# Patient Record
Sex: FEMALE | Race: WHITE | NOT HISPANIC OR LATINO | URBAN - METROPOLITAN AREA
[De-identification: names, ages, dates, MRNs, and addresses within clinical notes are randomized per-mention and may not be internally consistent; named-entity substitution may affect disease eponyms.]

---

## 2017-08-30 ENCOUNTER — OUTPATIENT (OUTPATIENT)
Dept: OUTPATIENT SERVICES | Facility: HOSPITAL | Age: 32
LOS: 1 days | Discharge: HOME | End: 2017-08-30

## 2017-08-30 ENCOUNTER — APPOINTMENT (OUTPATIENT)
Dept: ANTEPARTUM | Facility: CLINIC | Age: 32
End: 2017-08-30

## 2017-08-30 PROBLEM — Z00.00 ENCOUNTER FOR PREVENTIVE HEALTH EXAMINATION: Status: ACTIVE | Noted: 2017-08-30

## 2017-11-10 ENCOUNTER — RECORD ABSTRACTING (OUTPATIENT)
Age: 32
End: 2017-11-10

## 2017-11-10 DIAGNOSIS — Z78.9 OTHER SPECIFIED HEALTH STATUS: ICD-10-CM

## 2017-11-10 DIAGNOSIS — Z98.890 OTHER SPECIFIED POSTPROCEDURAL STATES: ICD-10-CM

## 2017-11-14 ENCOUNTER — OUTPATIENT (OUTPATIENT)
Dept: OUTPATIENT SERVICES | Facility: HOSPITAL | Age: 32
LOS: 1 days | Discharge: HOME | End: 2017-11-14

## 2017-11-16 ENCOUNTER — APPOINTMENT (OUTPATIENT)
Dept: OBGYN | Facility: CLINIC | Age: 32
End: 2017-11-16
Payer: SELF-PAY

## 2017-11-16 ENCOUNTER — TRANSCRIPTION ENCOUNTER (OUTPATIENT)
Age: 32
End: 2017-11-16

## 2017-11-16 ENCOUNTER — OUTPATIENT (OUTPATIENT)
Dept: OUTPATIENT SERVICES | Facility: HOSPITAL | Age: 32
LOS: 1 days | Discharge: HOME | End: 2017-11-16

## 2017-11-16 VITALS
BODY MASS INDEX: 34.83 KG/M2 | SYSTOLIC BLOOD PRESSURE: 165 MMHG | DIASTOLIC BLOOD PRESSURE: 98 MMHG | HEIGHT: 64 IN | WEIGHT: 204 LBS

## 2017-11-16 PROCEDURE — 81002 URINALYSIS NONAUTO W/O SCOPE: CPT

## 2017-11-16 PROCEDURE — 0502F SUBSEQUENT PRENATAL CARE: CPT

## 2017-11-20 LAB
GLUCOSE UR-MCNC: NORMAL
LEUKOCYTE ESTERASE UR QL STRIP: NORMAL
NITRITE UR QL STRIP: NORMAL
PROT UR STRIP-MCNC: NORMAL

## 2017-11-30 ENCOUNTER — APPOINTMENT (OUTPATIENT)
Dept: OBGYN | Facility: CLINIC | Age: 32
End: 2017-11-30

## 2017-12-20 ENCOUNTER — OUTPATIENT (OUTPATIENT)
Dept: OUTPATIENT SERVICES | Facility: HOSPITAL | Age: 32
LOS: 1 days | Discharge: HOME | End: 2017-12-20

## 2017-12-20 DIAGNOSIS — O99.89 OTHER SPECIFIED DISEASES AND CONDITIONS COMPLICATING PREGNANCY, CHILDBIRTH AND THE PUERPERIUM: ICD-10-CM

## 2017-12-28 ENCOUNTER — OUTPATIENT (OUTPATIENT)
Dept: OUTPATIENT SERVICES | Facility: HOSPITAL | Age: 32
LOS: 1 days | Discharge: HOME | End: 2017-12-28

## 2017-12-28 DIAGNOSIS — N89.8 OTHER SPECIFIED NONINFLAMMATORY DISORDERS OF VAGINA: ICD-10-CM

## 2017-12-28 DIAGNOSIS — O99.89 OTHER SPECIFIED DISEASES AND CONDITIONS COMPLICATING PREGNANCY, CHILDBIRTH AND THE PUERPERIUM: ICD-10-CM

## 2018-01-04 ENCOUNTER — INPATIENT (INPATIENT)
Facility: HOSPITAL | Age: 33
LOS: 3 days | Discharge: HOME | End: 2018-01-08
Attending: OBSTETRICS & GYNECOLOGY | Admitting: OBSTETRICS & GYNECOLOGY

## 2018-01-04 DIAGNOSIS — O99.89 OTHER SPECIFIED DISEASES AND CONDITIONS COMPLICATING PREGNANCY, CHILDBIRTH AND THE PUERPERIUM: ICD-10-CM

## 2018-01-12 DIAGNOSIS — Z3A.36 36 WEEKS GESTATION OF PREGNANCY: ICD-10-CM

## 2018-07-10 ENCOUNTER — OUTPATIENT (OUTPATIENT)
Dept: OUTPATIENT SERVICES | Facility: HOSPITAL | Age: 33
LOS: 1 days | Discharge: HOME | End: 2018-07-10

## 2018-07-10 DIAGNOSIS — E05.00 THYROTOXICOSIS WITH DIFFUSE GOITER WITHOUT THYROTOXIC CRISIS OR STORM: ICD-10-CM

## 2019-02-25 ENCOUNTER — OUTPATIENT (OUTPATIENT)
Dept: OUTPATIENT SERVICES | Facility: HOSPITAL | Age: 34
LOS: 1 days | Discharge: HOME | End: 2019-02-25

## 2019-02-25 DIAGNOSIS — R10.2 PELVIC AND PERINEAL PAIN: ICD-10-CM

## 2019-04-23 ENCOUNTER — OUTPATIENT (OUTPATIENT)
Dept: OUTPATIENT SERVICES | Facility: HOSPITAL | Age: 34
LOS: 1 days | Discharge: HOME | End: 2019-04-23

## 2019-04-23 DIAGNOSIS — N89.8 OTHER SPECIFIED NONINFLAMMATORY DISORDERS OF VAGINA: ICD-10-CM

## 2019-05-17 ENCOUNTER — INPATIENT (INPATIENT)
Facility: HOSPITAL | Age: 34
LOS: 1 days | Discharge: HOME | End: 2019-05-19
Attending: OBSTETRICS & GYNECOLOGY | Admitting: OBSTETRICS & GYNECOLOGY

## 2019-05-17 VITALS — SYSTOLIC BLOOD PRESSURE: 135 MMHG | HEART RATE: 103 BPM | DIASTOLIC BLOOD PRESSURE: 73 MMHG

## 2019-05-17 DIAGNOSIS — Z90.49 ACQUIRED ABSENCE OF OTHER SPECIFIED PARTS OF DIGESTIVE TRACT: Chronic | ICD-10-CM

## 2019-05-17 DIAGNOSIS — Z98.890 OTHER SPECIFIED POSTPROCEDURAL STATES: Chronic | ICD-10-CM

## 2019-05-17 DIAGNOSIS — Z90.89 ACQUIRED ABSENCE OF OTHER ORGANS: Chronic | ICD-10-CM

## 2019-05-17 LAB
ALBUMIN SERPL ELPH-MCNC: 3.6 G/DL — SIGNIFICANT CHANGE UP (ref 3.5–5.2)
ALP SERPL-CCNC: 118 U/L — HIGH (ref 30–115)
ALT FLD-CCNC: 11 U/L — SIGNIFICANT CHANGE UP (ref 0–41)
AMPHET UR-MCNC: NEGATIVE — SIGNIFICANT CHANGE UP
ANION GAP SERPL CALC-SCNC: 11 MMOL/L — SIGNIFICANT CHANGE UP (ref 7–14)
APPEARANCE UR: CLEAR — SIGNIFICANT CHANGE UP
AST SERPL-CCNC: 15 U/L — SIGNIFICANT CHANGE UP (ref 0–41)
BARBITURATES UR SCN-MCNC: NEGATIVE — SIGNIFICANT CHANGE UP
BASOPHILS # BLD AUTO: 0.02 K/UL — SIGNIFICANT CHANGE UP (ref 0–0.2)
BASOPHILS NFR BLD AUTO: 0.2 % — SIGNIFICANT CHANGE UP (ref 0–1)
BENZODIAZ UR-MCNC: NEGATIVE — SIGNIFICANT CHANGE UP
BILIRUB SERPL-MCNC: <0.2 MG/DL — SIGNIFICANT CHANGE UP (ref 0.2–1.2)
BILIRUB UR-MCNC: NEGATIVE — SIGNIFICANT CHANGE UP
BLD GP AB SCN SERPL QL: SIGNIFICANT CHANGE UP
BUN SERPL-MCNC: 11 MG/DL — SIGNIFICANT CHANGE UP (ref 10–20)
BUPRENORPHINE SCREEN, URINE RESULT: NEGATIVE — SIGNIFICANT CHANGE UP
CALCIUM SERPL-MCNC: 8.9 MG/DL — SIGNIFICANT CHANGE UP (ref 8.5–10.1)
CHLORIDE SERPL-SCNC: 104 MMOL/L — SIGNIFICANT CHANGE UP (ref 98–110)
CO2 SERPL-SCNC: 19 MMOL/L — SIGNIFICANT CHANGE UP (ref 17–32)
COCAINE METAB.OTHER UR-MCNC: NEGATIVE — SIGNIFICANT CHANGE UP
COLOR SPEC: YELLOW — SIGNIFICANT CHANGE UP
CREAT ?TM UR-MCNC: 63 MG/DL — SIGNIFICANT CHANGE UP
CREAT SERPL-MCNC: 0.5 MG/DL — LOW (ref 0.7–1.5)
DIFF PNL FLD: NEGATIVE — SIGNIFICANT CHANGE UP
EOSINOPHIL # BLD AUTO: 0.02 K/UL — SIGNIFICANT CHANGE UP (ref 0–0.7)
EOSINOPHIL NFR BLD AUTO: 0.2 % — SIGNIFICANT CHANGE UP (ref 0–8)
GLUCOSE SERPL-MCNC: 75 MG/DL — SIGNIFICANT CHANGE UP (ref 70–99)
GLUCOSE UR QL: NEGATIVE MG/DL — SIGNIFICANT CHANGE UP
HCT VFR BLD CALC: 36.1 % — LOW (ref 37–47)
HGB BLD-MCNC: 12.1 G/DL — SIGNIFICANT CHANGE UP (ref 12–16)
IMM GRANULOCYTES NFR BLD AUTO: 0.2 % — SIGNIFICANT CHANGE UP (ref 0.1–0.3)
KETONES UR-MCNC: NEGATIVE — SIGNIFICANT CHANGE UP
L&D DRUG SCREEN, URINE: SIGNIFICANT CHANGE UP
LDH SERPL L TO P-CCNC: 164 — SIGNIFICANT CHANGE UP (ref 50–242)
LEUKOCYTE ESTERASE UR-ACNC: NEGATIVE — SIGNIFICANT CHANGE UP
LYMPHOCYTES # BLD AUTO: 1.36 K/UL — SIGNIFICANT CHANGE UP (ref 1.2–3.4)
LYMPHOCYTES # BLD AUTO: 16.8 % — LOW (ref 20.5–51.1)
MCHC RBC-ENTMCNC: 30.3 PG — SIGNIFICANT CHANGE UP (ref 27–31)
MCHC RBC-ENTMCNC: 33.5 G/DL — SIGNIFICANT CHANGE UP (ref 32–37)
MCV RBC AUTO: 90.5 FL — SIGNIFICANT CHANGE UP (ref 81–99)
METHADONE UR-MCNC: NEGATIVE — SIGNIFICANT CHANGE UP
MONOCYTES # BLD AUTO: 0.65 K/UL — HIGH (ref 0.1–0.6)
MONOCYTES NFR BLD AUTO: 8 % — SIGNIFICANT CHANGE UP (ref 1.7–9.3)
NEUTROPHILS # BLD AUTO: 6.01 K/UL — SIGNIFICANT CHANGE UP (ref 1.4–6.5)
NEUTROPHILS NFR BLD AUTO: 74.6 % — SIGNIFICANT CHANGE UP (ref 42.2–75.2)
NITRITE UR-MCNC: NEGATIVE — SIGNIFICANT CHANGE UP
NRBC # BLD: 0 /100 WBCS — SIGNIFICANT CHANGE UP (ref 0–0)
OPIATES UR-MCNC: NEGATIVE — SIGNIFICANT CHANGE UP
OXYCODONE UR-MCNC: NEGATIVE — SIGNIFICANT CHANGE UP
PCP UR-MCNC: NEGATIVE — SIGNIFICANT CHANGE UP
PH UR: 7 — SIGNIFICANT CHANGE UP (ref 5–8)
PLATELET # BLD AUTO: 245 K/UL — SIGNIFICANT CHANGE UP (ref 130–400)
POTASSIUM SERPL-MCNC: 4.8 MMOL/L — SIGNIFICANT CHANGE UP (ref 3.5–5)
POTASSIUM SERPL-SCNC: 4.8 MMOL/L — SIGNIFICANT CHANGE UP (ref 3.5–5)
PRENATAL SYPHILIS TEST: SIGNIFICANT CHANGE UP
PROPOXYPHENE QUALITATIVE URINE RESULT: NEGATIVE — SIGNIFICANT CHANGE UP
PROT ?TM UR-MCNC: 20 MG/DLG/24H — SIGNIFICANT CHANGE UP
PROT SERPL-MCNC: 6.4 G/DL — SIGNIFICANT CHANGE UP (ref 6–8)
PROT UR-MCNC: NEGATIVE MG/DL — SIGNIFICANT CHANGE UP
PROT/CREAT UR-RTO: 0.3 RATIO — HIGH (ref 0–0.2)
RBC # BLD: 3.99 M/UL — LOW (ref 4.2–5.4)
RBC # FLD: 13.9 % — SIGNIFICANT CHANGE UP (ref 11.5–14.5)
SODIUM SERPL-SCNC: 134 MMOL/L — LOW (ref 135–146)
SP GR SPEC: 1.01 — SIGNIFICANT CHANGE UP (ref 1.01–1.03)
TYPE + AB SCN PNL BLD: SIGNIFICANT CHANGE UP
URATE SERPL-MCNC: 4.1 MG/DL — SIGNIFICANT CHANGE UP (ref 2.5–7)
UROBILINOGEN FLD QL: 1 MG/DL (ref 0.2–0.2)
WBC # BLD: 8.08 K/UL — SIGNIFICANT CHANGE UP (ref 4.8–10.8)
WBC # FLD AUTO: 8.08 K/UL — SIGNIFICANT CHANGE UP (ref 4.8–10.8)

## 2019-05-17 RX ORDER — GLYCERIN ADULT
1 SUPPOSITORY, RECTAL RECTAL AT BEDTIME
Refills: 0 | Status: DISCONTINUED | OUTPATIENT
Start: 2019-05-17 | End: 2019-05-19

## 2019-05-17 RX ORDER — KETOROLAC TROMETHAMINE 30 MG/ML
30 SYRINGE (ML) INJECTION ONCE
Refills: 0 | Status: DISCONTINUED | OUTPATIENT
Start: 2019-05-17 | End: 2019-05-17

## 2019-05-17 RX ORDER — SIMETHICONE 80 MG/1
80 TABLET, CHEWABLE ORAL EVERY 4 HOURS
Refills: 0 | Status: DISCONTINUED | OUTPATIENT
Start: 2019-05-17 | End: 2019-05-19

## 2019-05-17 RX ORDER — METHYLDOPA 250 MG
500 TABLET ORAL EVERY 12 HOURS
Refills: 0 | Status: DISCONTINUED | OUTPATIENT
Start: 2019-05-17 | End: 2019-05-19

## 2019-05-17 RX ORDER — MAGNESIUM HYDROXIDE 400 MG/1
30 TABLET, CHEWABLE ORAL
Refills: 0 | Status: DISCONTINUED | OUTPATIENT
Start: 2019-05-17 | End: 2019-05-19

## 2019-05-17 RX ORDER — METHYLDOPA 250 MG
500 TABLET ORAL
Refills: 0 | Status: DISCONTINUED | OUTPATIENT
Start: 2019-05-17 | End: 2019-05-17

## 2019-05-17 RX ORDER — BENZOCAINE 10 %
1 GEL (GRAM) MUCOUS MEMBRANE EVERY 6 HOURS
Refills: 0 | Status: DISCONTINUED | OUTPATIENT
Start: 2019-05-17 | End: 2019-05-19

## 2019-05-17 RX ORDER — DOCUSATE SODIUM 100 MG
100 CAPSULE ORAL
Refills: 0 | Status: DISCONTINUED | OUTPATIENT
Start: 2019-05-17 | End: 2019-05-19

## 2019-05-17 RX ORDER — AER TRAVELER 0.5 G/1
1 SOLUTION RECTAL; TOPICAL EVERY 4 HOURS
Refills: 0 | Status: DISCONTINUED | OUTPATIENT
Start: 2019-05-17 | End: 2019-05-19

## 2019-05-17 RX ORDER — SODIUM CHLORIDE 9 MG/ML
3 INJECTION INTRAMUSCULAR; INTRAVENOUS; SUBCUTANEOUS EVERY 8 HOURS
Refills: 0 | Status: DISCONTINUED | OUTPATIENT
Start: 2019-05-17 | End: 2019-05-19

## 2019-05-17 RX ORDER — DIPHENHYDRAMINE HCL 50 MG
25 CAPSULE ORAL EVERY 6 HOURS
Refills: 0 | Status: DISCONTINUED | OUTPATIENT
Start: 2019-05-17 | End: 2019-05-19

## 2019-05-17 RX ORDER — OXYTOCIN 10 UNIT/ML
2 VIAL (ML) INJECTION
Qty: 30 | Refills: 0 | Status: DISCONTINUED | OUTPATIENT
Start: 2019-05-17 | End: 2019-05-17

## 2019-05-17 RX ORDER — OXYTOCIN 10 UNIT/ML
333.33 VIAL (ML) INJECTION
Qty: 20 | Refills: 0 | Status: DISCONTINUED | OUTPATIENT
Start: 2019-05-17 | End: 2019-05-19

## 2019-05-17 RX ORDER — LANOLIN
1 OINTMENT (GRAM) TOPICAL EVERY 6 HOURS
Refills: 0 | Status: DISCONTINUED | OUTPATIENT
Start: 2019-05-17 | End: 2019-05-19

## 2019-05-17 RX ORDER — METHYLDOPA 250 MG
500 TABLET ORAL THREE TIMES A DAY
Refills: 0 | Status: DISCONTINUED | OUTPATIENT
Start: 2019-05-17 | End: 2019-05-17

## 2019-05-17 RX ORDER — SODIUM CHLORIDE 9 MG/ML
1000 INJECTION, SOLUTION INTRAVENOUS
Refills: 0 | Status: DISCONTINUED | OUTPATIENT
Start: 2019-05-17 | End: 2019-05-17

## 2019-05-17 RX ORDER — HYDROCORTISONE 1 %
1 OINTMENT (GRAM) TOPICAL EVERY 6 HOURS
Refills: 0 | Status: DISCONTINUED | OUTPATIENT
Start: 2019-05-17 | End: 2019-05-19

## 2019-05-17 RX ORDER — PRAMOXINE HYDROCHLORIDE 150 MG/15G
1 AEROSOL, FOAM RECTAL EVERY 4 HOURS
Refills: 0 | Status: DISCONTINUED | OUTPATIENT
Start: 2019-05-17 | End: 2019-05-19

## 2019-05-17 RX ORDER — OXYTOCIN 10 UNIT/ML
333.33 VIAL (ML) INJECTION
Qty: 20 | Refills: 0 | Status: DISCONTINUED | OUTPATIENT
Start: 2019-05-17 | End: 2019-05-17

## 2019-05-17 RX ORDER — DIBUCAINE 1 %
1 OINTMENT (GRAM) RECTAL EVERY 6 HOURS
Refills: 0 | Status: DISCONTINUED | OUTPATIENT
Start: 2019-05-17 | End: 2019-05-19

## 2019-05-17 RX ADMIN — Medication 500 MILLIGRAM(S): at 14:05

## 2019-05-17 RX ADMIN — Medication 500 MILLIGRAM(S): at 20:08

## 2019-05-17 RX ADMIN — Medication 30 MILLIGRAM(S): at 18:49

## 2019-05-17 RX ADMIN — Medication 2 MILLIUNIT(S)/MIN: at 09:11

## 2019-05-17 NOTE — PROGRESS NOTE ADULT - ASSESSMENT
32 yo  @39w, GBS neg, cHTN on aldomet, IOL for favorable cervix, on pitocin, doing well.    - pain management PRN  - cont EFM and toco  - clear liquid diet, IV hydration  - monitor vitals  - c/w pitocin    Dr. Berrios and Dr. Smith aware. 32 yo  @39w, GBS neg, cHTN on aldomet, IOL for favorable cervix, s/p epi, s/p AROM, on pitocin, doing well.    - pain management with epidural  - cont EFM and toco  - clear liquid diet, IV hydration  - monitor vitals  - c/w pitocin    Dr. Berrios and Dr. Smith aware.

## 2019-05-17 NOTE — OB PROVIDER H&P - PMH
Chronic hypertension  on aldomet 500 mg bid since 2018  Graves disease  remission since this current pregnancy; no medications for graves since 7weeks

## 2019-05-17 NOTE — OB PROVIDER H&P - ATTENDING COMMENTS
IMP: IUP @ 39 wks, Ch HTN on Aldomet, Favorable Cervix at Term    Plan: Admit, Pitocin Induction, Pain Management, Anticipated

## 2019-05-17 NOTE — OB PROVIDER H&P - ASSESSMENT
34 y/o  @ 39 weeks, chronic htn on aldomet favorable cervix for pitocin IOL  admit  hydrate  monitor  pitocin  analgesia prn  Dr Smith aware

## 2019-05-17 NOTE — OB PROVIDER H&P - NSHPLABSRESULTS_GEN_ALL_CORE
11/13/18 12.4 weeks single iup normal nuchal  12/12/18 @ 17.3 weeks s= d  1/4/19 @ 20.2 weeks normal anatomy  1/29/19 @ 24.3 weeks  transverse efw 763 grams placenta posterior  2/25/19 @ 27.3 weeks transverse posterior placenta efw 1096grams mvp44 bpp8/8  3/26/18 @ 31.1 weeks cephalic placenta posterior efw 1703 grams 11/13/18 12.4 weeks single iup normal nuchal  12/12/18 @ 17.3 weeks s= d  1/4/19 @ 20.2 weeks normal anatomy  1/29/19 @ 24.3 weeks  transverse efw 763 grams placenta posterior  2/25/19 @ 27.3 weeks transverse posterior placenta efw 1096grams mvp44 bpp8/8  3/26/18 @ 31.1 weeks cephalic placenta posterior efw 1703 grams    TFTs    TSH 1.35  T 4 free 1.1  T3 total 154    Glucose 81

## 2019-05-17 NOTE — PROCEDURE NOTE - NSANESTHEXAM_OBGYN_ALL_OB_FT
h/o chronic htn before pregnancy on aldomat 3x day and graves dx since 3/2018 on no medication. psh of appendectomy and tonsillectomy with no issues with anesthesia

## 2019-05-17 NOTE — OB PROVIDER H&P - HISTORY OF PRESENT ILLNESS
The pt is a 33y y/o G 3	P 1011 @ 39.1   weeks  edc   5/23/19  dated by  lmp & 1st trimester sonogram who presents to labor & delivery favorable cervix for IOL  Pt reports + fm no srom, no vaginal bleeding.

## 2019-05-17 NOTE — PROGRESS NOTE ADULT - ASSESSMENT
34 yo  @39w, GBS neg, cHTN on aldomet, IOL for favorable cervix, on pitocin, doing well.    - pain management PRN  - cont EFM and toco  - clear liquid diet, IV hydration  - monitor vitals  - c/w pitocin  - f/up Upr/cr    Dr. Carlisle and Dr. Smith aware.

## 2019-05-17 NOTE — OB PROVIDER DELIVERY SUMMARY - NSPROVIDERDELIVERYNOTE_OBGYN_ALL_OB_FT
Pt became fully dilated and pushed well over intact perineum, delivery of head in RAKESH position, nose and mouth bulb suctioned on perineum, followed by delivery of shoulders and body without difficulty, live male infant, APGARs 9/9, 3-vessel cord + placenta complete, no complications

## 2019-05-17 NOTE — OB PROVIDER H&P - NSHPPHYSICALEXAM_GEN_ALL_CORE
PHYSICAL EXAM:  T(F): 98.2 (05-17 @ 08:07)  HR: 100 (05-17 @ 08:07)  BP: 135/73 (05-17 @ 08:07)  RR: 18 (05-17 @ 08:07)  Constitutional: AAOx3, NAD  Abdomen: Soft, gravid, nontender, no palpable ctx    TOCO none  VE 2/50/-3 vertex

## 2019-05-17 NOTE — PROGRESS NOTE ADULT - SUBJECTIVE AND OBJECTIVE BOX
PGY I Note    S: Pt seen and examined at bedside. Doing well, pain controlled, experiencing some pressure with contractions.     Vital Signs Last 24 Hrs  T(F): 98.2 (17 May 2019 08:07), Max: 98.2 (17 May 2019 08:07)  HR: 101 (17 May 2019 17:00) (81 - 114)  BP: 119/79 (17 May 2019 17:00) (114/79 - 138/89)  RR: 18 (17 May 2019 08:07) (18 - 18)    EFM: 130/mod hemant/+accel  TOCO: q2-3min  SVE: /-1, vtx    Labs:                        12.1   8.08  )-----------( 245      ( 17 May 2019 08:10 )             36.1           134<L>  |  104  |  11  ----------------------------<  75  4.8   |  19  |  0.5<L>    Ca    8.9      17 May 2019 08:10    TPro  6.4  /  Alb  3.6  /  TBili  <0.2  /  DBili  x   /  AST  15  /  ALT  11  /  AlkPhos  118<H>        Urinalysis Basic - ( 17 May 2019 08:10 )    Color: Yellow / Appearance: Clear / S.015 / pH: x  Gluc: x / Ketone: Negative  / Bili: Negative / Urobili: 1.0 mg/dL   Blood: x / Protein: Negative mg/dL / Nitrite: Negative   Leuk Esterase: Negative / RBC: x / WBC x   Sq Epi: x / Non Sq Epi: x / Bacteria: x        Prenatal Syphilis Test: Nonreact (19 @ 08:10)      Meds:  69607 pitocin  @1340 epidural PGY I Note    S: Pt seen and examined at bedside. Doing well, pain controlled, experiencing some pressure with contractions.     Vital Signs Last 24 Hrs  T(F): 98.2 (17 May 2019 08:07), Max: 98.2 (17 May 2019 08:07)  HR: 101 (17 May 2019 17:00) (81 - 114)  BP: 119/79 (17 May 2019 17:00) (114/79 - 138/89)  RR: 18 (17 May 2019 08:07) (18 - 18)    EFM: 130/mod hemant/+accel  TOCO: q2-3min  SVE: /-1, vtx @4501 by Dr. Smith    Labs:                        12.1   8.08  )-----------( 245      ( 17 May 2019 08:10 )             36.1           134<L>  |  104  |  11  ----------------------------<  75  4.8   |  19  |  0.5<L>    Ca    8.9      17 May 2019 08:10    TPro  6.4  /  Alb  3.6  /  TBili  <0.2  /  DBili  x   /  AST  15  /  ALT  11  /  AlkPhos  118<H>        Urinalysis Basic - ( 17 May 2019 08:10 )    Color: Yellow / Appearance: Clear / S.015 / pH: x  Gluc: x / Ketone: Negative  / Bili: Negative / Urobili: 1.0 mg/dL   Blood: x / Protein: Negative mg/dL / Nitrite: Negative   Leuk Esterase: Negative / RBC: x / WBC x   Sq Epi: x / Non Sq Epi: x / Bacteria: x        Prenatal Syphilis Test: Nonreact (19 @ 08:10)      Meds:  98073 pitocin started, now @6mu/min  @1340 epidural PGY I Note    S: Pt seen at bedside for labor check. Doing well, pain controlled, experiencing some pressure with contractions.     Vital Signs Last 24 Hrs  T(F): 98.2 (17 May 2019 08:07), Max: 98.2 (17 May 2019 08:07)  HR: 101 (17 May 2019 17:00) (81 - 114)  BP: 119/79 (17 May 2019 17:00) (114/79 - 138/89)  RR: 18 (17 May 2019 08:07) (18 - 18)    EFM: 130/mod hemant/+accel  TOCO: q2-3min  SVE: /-1, vtx @7928 by Dr. Smith    Labs:                        12.1   8.08  )-----------( 245      ( 17 May 2019 08:10 )             36.1           134<L>  |  104  |  11  ----------------------------<  75  4.8   |  19  |  0.5<L>    Ca    8.9      17 May 2019 08:10    TPro  6.4  /  Alb  3.6  /  TBili  <0.2  /  DBili  x   /  AST  15  /  ALT  11  /  AlkPhos  118<H>        Urinalysis Basic - ( 17 May 2019 08:10 )    Color: Yellow / Appearance: Clear / S.015 / pH: x  Gluc: x / Ketone: Negative  / Bili: Negative / Urobili: 1.0 mg/dL   Blood: x / Protein: Negative mg/dL / Nitrite: Negative   Leuk Esterase: Negative / RBC: x / WBC x   Sq Epi: x / Non Sq Epi: x / Bacteria: x        Prenatal Syphilis Test: Nonreact (19 @ 08:10)      Meds:  89821 pitocin started, now @6mu/min  @1340 epidural

## 2019-05-18 ENCOUNTER — TRANSCRIPTION ENCOUNTER (OUTPATIENT)
Age: 34
End: 2019-05-18

## 2019-05-18 LAB
HCT VFR BLD CALC: 36.1 % — LOW (ref 37–47)
HGB BLD-MCNC: 12 G/DL — SIGNIFICANT CHANGE UP (ref 12–16)
MCHC RBC-ENTMCNC: 31.1 PG — HIGH (ref 27–31)
MCHC RBC-ENTMCNC: 33.2 G/DL — SIGNIFICANT CHANGE UP (ref 32–37)
MCV RBC AUTO: 93.5 FL — SIGNIFICANT CHANGE UP (ref 81–99)
NRBC # BLD: 0 /100 WBCS — SIGNIFICANT CHANGE UP (ref 0–0)
PLATELET # BLD AUTO: 224 K/UL — SIGNIFICANT CHANGE UP (ref 130–400)
RBC # BLD: 3.86 M/UL — LOW (ref 4.2–5.4)
RBC # FLD: 13.8 % — SIGNIFICANT CHANGE UP (ref 11.5–14.5)
WBC # BLD: 8.47 K/UL — SIGNIFICANT CHANGE UP (ref 4.8–10.8)
WBC # FLD AUTO: 8.47 K/UL — SIGNIFICANT CHANGE UP (ref 4.8–10.8)

## 2019-05-18 RX ORDER — IBUPROFEN 200 MG
600 TABLET ORAL EVERY 6 HOURS
Refills: 0 | Status: COMPLETED | OUTPATIENT
Start: 2019-05-18 | End: 2020-04-15

## 2019-05-18 RX ORDER — IBUPROFEN 200 MG
600 TABLET ORAL EVERY 6 HOURS
Refills: 0 | Status: DISCONTINUED | OUTPATIENT
Start: 2019-05-18 | End: 2019-05-19

## 2019-05-18 RX ORDER — OXYCODONE HYDROCHLORIDE 5 MG/1
5 TABLET ORAL
Refills: 0 | Status: DISCONTINUED | OUTPATIENT
Start: 2019-05-18 | End: 2019-05-19

## 2019-05-18 RX ORDER — KETOROLAC TROMETHAMINE 30 MG/ML
30 SYRINGE (ML) INJECTION ONCE
Refills: 0 | Status: DISCONTINUED | OUTPATIENT
Start: 2019-05-18 | End: 2019-05-19

## 2019-05-18 RX ORDER — ACETAMINOPHEN 500 MG
975 TABLET ORAL EVERY 6 HOURS
Refills: 0 | Status: DISCONTINUED | OUTPATIENT
Start: 2019-05-18 | End: 2019-05-19

## 2019-05-18 RX ORDER — OXYCODONE HYDROCHLORIDE 5 MG/1
5 TABLET ORAL ONCE
Refills: 0 | Status: DISCONTINUED | OUTPATIENT
Start: 2019-05-18 | End: 2019-05-19

## 2019-05-18 RX ADMIN — Medication 600 MILLIGRAM(S): at 17:31

## 2019-05-18 RX ADMIN — Medication 500 MILLIGRAM(S): at 06:17

## 2019-05-18 RX ADMIN — SODIUM CHLORIDE 3 MILLILITER(S): 9 INJECTION INTRAMUSCULAR; INTRAVENOUS; SUBCUTANEOUS at 21:12

## 2019-05-18 RX ADMIN — Medication 600 MILLIGRAM(S): at 18:00

## 2019-05-18 RX ADMIN — Medication 975 MILLIGRAM(S): at 15:03

## 2019-05-18 RX ADMIN — SODIUM CHLORIDE 3 MILLILITER(S): 9 INJECTION INTRAMUSCULAR; INTRAVENOUS; SUBCUTANEOUS at 06:16

## 2019-05-18 RX ADMIN — Medication 600 MILLIGRAM(S): at 11:38

## 2019-05-18 RX ADMIN — Medication 975 MILLIGRAM(S): at 04:47

## 2019-05-18 RX ADMIN — Medication 975 MILLIGRAM(S): at 21:11

## 2019-05-18 RX ADMIN — Medication 600 MILLIGRAM(S): at 12:10

## 2019-05-18 RX ADMIN — Medication 975 MILLIGRAM(S): at 15:33

## 2019-05-18 RX ADMIN — Medication 500 MILLIGRAM(S): at 17:31

## 2019-05-18 RX ADMIN — SODIUM CHLORIDE 3 MILLILITER(S): 9 INJECTION INTRAMUSCULAR; INTRAVENOUS; SUBCUTANEOUS at 14:37

## 2019-05-18 NOTE — PROGRESS NOTE ADULT - ASSESSMENT
IMP: s/p , CH HTN - PPD # 1 - Doing Well    Plan: f/u CBC, Continued Aldomet 500 BID, NSAID's/PNV's, Anticipated dc

## 2019-05-18 NOTE — PROGRESS NOTE ADULT - SUBJECTIVE AND OBJECTIVE BOX
Pt seen at bedside, no complaints, nursing    Vital Signs Last 24 Hrs  T(C): 36.6 (17 May 2019 23:38), Max: 36.9 (17 May 2019 17:45)  T(F): 97.9 (17 May 2019 23:38), Max: 98.5 (17 May 2019 17:45)  HR: 85 (18 May 2019 03:15) (81 - 136)  BP: 121/75 (18 May 2019 03:15) (114/79 - 138/89)  RR: 20 (17 May 2019 23:38) (18 - 20)    Resp - CTA b/l  CVS - S1S2+, RRR  Breasts - soft, NT  Abd - soft, NTND, BS+, uterus - firm  VE - Moderate lochia, perineum- intact  Ext - No Homans                          12.1   8.08  )-----------( 245      ( 17 May 2019 08:10 )             36.1       A POS, Rubella - Immune, Rubeola - Immune, RPR - NR

## 2019-05-19 ENCOUNTER — TRANSCRIPTION ENCOUNTER (OUTPATIENT)
Age: 34
End: 2019-05-19

## 2019-05-19 VITALS
DIASTOLIC BLOOD PRESSURE: 81 MMHG | RESPIRATION RATE: 18 BRPM | TEMPERATURE: 98 F | SYSTOLIC BLOOD PRESSURE: 128 MMHG | HEART RATE: 63 BPM

## 2019-05-19 RX ORDER — METHYLDOPA 250 MG
1 TABLET ORAL
Qty: 0 | Refills: 0 | DISCHARGE
Start: 2019-05-19

## 2019-05-19 RX ORDER — IBUPROFEN 200 MG
1 TABLET ORAL
Qty: 0 | Refills: 0 | DISCHARGE
Start: 2019-05-19

## 2019-05-19 RX ORDER — DOCUSATE SODIUM 100 MG
1 CAPSULE ORAL
Qty: 0 | Refills: 0 | DISCHARGE
Start: 2019-05-19

## 2019-05-19 RX ORDER — ACETAMINOPHEN 500 MG
3 TABLET ORAL
Qty: 0 | Refills: 0 | DISCHARGE
Start: 2019-05-19

## 2019-05-19 RX ADMIN — Medication 500 MILLIGRAM(S): at 06:36

## 2019-05-19 RX ADMIN — Medication 600 MILLIGRAM(S): at 06:34

## 2019-05-19 RX ADMIN — Medication 600 MILLIGRAM(S): at 11:15

## 2019-05-19 RX ADMIN — Medication 600 MILLIGRAM(S): at 00:06

## 2019-05-19 RX ADMIN — Medication 600 MILLIGRAM(S): at 07:10

## 2019-05-19 NOTE — PROGRESS NOTE ADULT - ASSESSMENT
IMP: s/p  -PPD # 2 - doing Well    Plan: dc home, NSAID's/PNV's, Continue aldomet 500 BID, f/u office - 6 wks

## 2019-05-19 NOTE — DISCHARGE NOTE OB - CARE PROVIDER_API CALL
Jeremy Smith)  Obstetrics and Gynecology  [address] [place] [address]  Phone: [phone]  Fax: [phone]  Follow Up Time:

## 2019-05-19 NOTE — DISCHARGE NOTE OB - PATIENT PORTAL LINK FT
You can access the [facility] Patient Portal, offered by [facility], by registering with the following website: [email or web address]

## 2019-05-19 NOTE — DISCHARGE NOTE OB - MEDICATION SUMMARY - MEDICATIONS TO TAKE
I will START or STAY ON the medications listed below when I get home from the hospital:    acetaminophen 325 mg oral tablet  -- 3 tab(s) by mouth every 6 hours  -- Indication: For pain    ibuprofen 600 mg oral tablet  -- 1 tab(s) by mouth every 6 hours  -- Indication: For pain    docusate sodium 100 mg oral capsule  -- 1 cap(s) by mouth 2 times a day, As needed, For stool softening  -- Indication: For Constipation I will START or STAY ON the medications listed below when I get home from the hospital:    acetaminophen 325 mg oral tablet  -- 3 tab(s) by mouth every 6 hours  -- Indication: For pain    ibuprofen 600 mg oral tablet  -- 1 tab(s) by mouth every 6 hours  -- Indication: For pain    methyldopa 500 mg oral tablet  -- 1 tab(s) by mouth every 12 hours  -- Indication: For blood pressure    docusate sodium 100 mg oral capsule  -- 1 cap(s) by mouth 2 times a day, As needed, For stool softening  -- Indication: For Constipation I will START or STAY ON the medications listed below when I get home from the hospital:    acetaminophen 325 mg oral tablet  -- 3 tab(s) by mouth every 6 hours, As Needed  -- Indication: For pain    ibuprofen 600 mg oral tablet  -- 1 tab(s) by mouth every 6 hours, As Needed  -- Indication: For pain    methyldopa 500 mg oral tablet  -- 1 tab(s) by mouth every 12 hours  -- Indication: For blood pressure    docusate sodium 100 mg oral capsule  -- 1 cap(s) by mouth 2 times a day, As needed, For stool softening  -- Indication: For Constipation

## 2019-05-19 NOTE — PROGRESS NOTE ADULT - SUBJECTIVE AND OBJECTIVE BOX
Pt seen at bedside, no complaints, nursing    Vital Signs Last 24 Hrs  T(C): 36.4 (19 May 2019 00:04), Max: 36.6 (18 May 2019 08:27)  T(F): 97.5 (19 May 2019 00:04), Max: 97.9 (18 May 2019 08:27)  HR: 73 (19 May 2019 03:21) (70 - 99)  BP: 139/87 (19 May 2019 03:21) (129/77 - 139/87)  RR: 20 (19 May 2019 00:04) (18 - 20)    Resp - CTA b/l  CVS - S1S2+, RRR  Breasts - soft, NT  Abd - soft, NTND, BS+, uterus - firm  VE - Minimal lochia, perineum- intact  Ext - No Homans                          12.0   8.47  )-----------( 224      ( 18 May 2019 11:55 )             36.1                         12.1   8.08  )-----------( 245      ( 17 May 2019 08:10 )             36.1

## 2019-05-19 NOTE — DISCHARGE NOTE OB - HOSPITAL COURSE
05-19-19 @ 10:15    HPI:  The pt is a 33y y/o G 3	P 1011 @ 39.1   weeks  edc   5/23/19  dated by  lmp & 1st trimester sonogram who presents to labor & delivery favorable cervix for IOL  Pt reports + fm no srom, no vaginal bleeding. (17 May 2019 08:05)      PAST MEDICAL & SURGICAL HISTORY:  Chronic hypertension: on aldomet 500 mg bid since 2018  Graves disease: remission since this current pregnancy; no medications for graves since 7weeks  History of tonsillectomy  H/O laparoscopy  History of appendectomy      POST PARTUM COURSE:   uncomplicated labor and postpartum course, discharged PPD2       LABS:                        12.0   8.47  )-----------( 224      ( 18 May 2019 11:55 )             36.1       05-17-19 @ 08:10      134<L>  |  104  |  11  ----------------------------<  75  4.8   |  19  |  0.5<L>        Ca    8.9      17 May 2019 08:10    TPro  6.4  /  Alb  3.6  /  TBili  <0.2  /  DBili  x   /  AST  15  /  ALT  11  /  AlkPhos  118<H>  05-17-19 @ 08:10        Allergies    No Known Allergies    Intolerances

## 2019-05-22 DIAGNOSIS — Z3A.39 39 WEEKS GESTATION OF PREGNANCY: ICD-10-CM

## 2020-11-22 ENCOUNTER — TRANSCRIPTION ENCOUNTER (OUTPATIENT)
Age: 35
End: 2020-11-22

## 2021-04-30 NOTE — PROCEDURAL SAFETY CHECKLIST WITH OR WITHOUT SEDATION - NSBLDPRODCTAVAIL_GEN_ALL_CORE
Routing refill request to provider for review/approval because:  Labs not current:  BMP 5/2019 - future order already in place  Patient needs to be seen because it has been more than 1 year since last office visit.  Last recorded BP > year    Arnaud Novak RN         not applicable

## 2021-06-21 ENCOUNTER — TRANSCRIPTION ENCOUNTER (OUTPATIENT)
Age: 36
End: 2021-06-21

## 2021-08-01 ENCOUNTER — TRANSCRIPTION ENCOUNTER (OUTPATIENT)
Age: 36
End: 2021-08-01

## 2021-08-04 ENCOUNTER — TRANSCRIPTION ENCOUNTER (OUTPATIENT)
Age: 36
End: 2021-08-04

## 2022-03-07 ENCOUNTER — EMERGENCY (EMERGENCY)
Facility: HOSPITAL | Age: 37
LOS: 0 days | Discharge: HOME | End: 2022-03-08
Attending: EMERGENCY MEDICINE | Admitting: EMERGENCY MEDICINE
Payer: COMMERCIAL

## 2022-03-07 VITALS
HEIGHT: 64 IN | HEART RATE: 118 BPM | SYSTOLIC BLOOD PRESSURE: 183 MMHG | TEMPERATURE: 99 F | RESPIRATION RATE: 18 BRPM | WEIGHT: 199.96 LBS | OXYGEN SATURATION: 99 % | DIASTOLIC BLOOD PRESSURE: 93 MMHG

## 2022-03-07 VITALS — DIASTOLIC BLOOD PRESSURE: 65 MMHG | SYSTOLIC BLOOD PRESSURE: 151 MMHG | HEART RATE: 97 BPM

## 2022-03-07 DIAGNOSIS — F32.A DEPRESSION, UNSPECIFIED: ICD-10-CM

## 2022-03-07 DIAGNOSIS — R82.71 BACTERIURIA: ICD-10-CM

## 2022-03-07 DIAGNOSIS — O20.9 HEMORRHAGE IN EARLY PREGNANCY, UNSPECIFIED: ICD-10-CM

## 2022-03-07 DIAGNOSIS — O99.891 OTHER SPECIFIED DISEASES AND CONDITIONS COMPLICATING PREGNANCY: ICD-10-CM

## 2022-03-07 DIAGNOSIS — Z90.89 ACQUIRED ABSENCE OF OTHER ORGANS: Chronic | ICD-10-CM

## 2022-03-07 DIAGNOSIS — Z98.890 OTHER SPECIFIED POSTPROCEDURAL STATES: Chronic | ICD-10-CM

## 2022-03-07 DIAGNOSIS — I10 ESSENTIAL (PRIMARY) HYPERTENSION: ICD-10-CM

## 2022-03-07 DIAGNOSIS — Z3A.08 8 WEEKS GESTATION OF PREGNANCY: ICD-10-CM

## 2022-03-07 DIAGNOSIS — O21.9 VOMITING OF PREGNANCY, UNSPECIFIED: ICD-10-CM

## 2022-03-07 DIAGNOSIS — O99.341 OTHER MENTAL DISORDERS COMPLICATING PREGNANCY, FIRST TRIMESTER: ICD-10-CM

## 2022-03-07 DIAGNOSIS — Z90.49 ACQUIRED ABSENCE OF OTHER SPECIFIED PARTS OF DIGESTIVE TRACT: Chronic | ICD-10-CM

## 2022-03-07 PROBLEM — E05.00 THYROTOXICOSIS WITH DIFFUSE GOITER WITHOUT THYROTOXIC CRISIS OR STORM: Chronic | Status: ACTIVE | Noted: 2019-05-17

## 2022-03-07 LAB
ALBUMIN SERPL ELPH-MCNC: 4.3 G/DL — SIGNIFICANT CHANGE UP (ref 3.5–5.2)
ALP SERPL-CCNC: 66 U/L — SIGNIFICANT CHANGE UP (ref 30–115)
ALT FLD-CCNC: 29 U/L — SIGNIFICANT CHANGE UP (ref 0–41)
ANION GAP SERPL CALC-SCNC: 13 MMOL/L — SIGNIFICANT CHANGE UP (ref 7–14)
APPEARANCE UR: ABNORMAL
AST SERPL-CCNC: 17 U/L — SIGNIFICANT CHANGE UP (ref 0–41)
B-OH-BUTYR SERPL-SCNC: 0.4 MMOL/L — SIGNIFICANT CHANGE UP
BACTERIA # UR AUTO: NEGATIVE — SIGNIFICANT CHANGE UP
BASOPHILS # BLD AUTO: 0.03 K/UL — SIGNIFICANT CHANGE UP (ref 0–0.2)
BASOPHILS NFR BLD AUTO: 0.3 % — SIGNIFICANT CHANGE UP (ref 0–1)
BILIRUB SERPL-MCNC: 0.4 MG/DL — SIGNIFICANT CHANGE UP (ref 0.2–1.2)
BILIRUB UR-MCNC: ABNORMAL
BLD GP AB SCN SERPL QL: SIGNIFICANT CHANGE UP
BUN SERPL-MCNC: 7 MG/DL — LOW (ref 10–20)
CALCIUM SERPL-MCNC: 9.6 MG/DL — SIGNIFICANT CHANGE UP (ref 8.5–10.1)
CHLORIDE SERPL-SCNC: 101 MMOL/L — SIGNIFICANT CHANGE UP (ref 98–110)
CO2 SERPL-SCNC: 22 MMOL/L — SIGNIFICANT CHANGE UP (ref 17–32)
COLOR SPEC: YELLOW — SIGNIFICANT CHANGE UP
CREAT SERPL-MCNC: 0.7 MG/DL — SIGNIFICANT CHANGE UP (ref 0.7–1.5)
DIFF PNL FLD: ABNORMAL
EGFR: 116 ML/MIN/1.73M2 — SIGNIFICANT CHANGE UP
EOSINOPHIL # BLD AUTO: 0.05 K/UL — SIGNIFICANT CHANGE UP (ref 0–0.7)
EOSINOPHIL NFR BLD AUTO: 0.6 % — SIGNIFICANT CHANGE UP (ref 0–8)
EPI CELLS # UR: 9 /HPF — HIGH (ref 0–5)
GLUCOSE SERPL-MCNC: 83 MG/DL — SIGNIFICANT CHANGE UP (ref 70–99)
GLUCOSE UR QL: NEGATIVE — SIGNIFICANT CHANGE UP
HCG SERPL-ACNC: HIGH MIU/ML
HCT VFR BLD CALC: 37.8 % — SIGNIFICANT CHANGE UP (ref 37–47)
HGB BLD-MCNC: 12.4 G/DL — SIGNIFICANT CHANGE UP (ref 12–16)
HYALINE CASTS # UR AUTO: 16 /LPF — HIGH (ref 0–7)
IMM GRANULOCYTES NFR BLD AUTO: 0.2 % — SIGNIFICANT CHANGE UP (ref 0.1–0.3)
KETONES UR-MCNC: ABNORMAL
LEUKOCYTE ESTERASE UR-ACNC: ABNORMAL
LIDOCAIN IGE QN: 21 U/L — SIGNIFICANT CHANGE UP (ref 7–60)
LYMPHOCYTES # BLD AUTO: 1.98 K/UL — SIGNIFICANT CHANGE UP (ref 1.2–3.4)
LYMPHOCYTES # BLD AUTO: 22.3 % — SIGNIFICANT CHANGE UP (ref 20.5–51.1)
MCHC RBC-ENTMCNC: 28.8 PG — SIGNIFICANT CHANGE UP (ref 27–31)
MCHC RBC-ENTMCNC: 32.8 G/DL — SIGNIFICANT CHANGE UP (ref 32–37)
MCV RBC AUTO: 87.9 FL — SIGNIFICANT CHANGE UP (ref 81–99)
MONOCYTES # BLD AUTO: 0.54 K/UL — SIGNIFICANT CHANGE UP (ref 0.1–0.6)
MONOCYTES NFR BLD AUTO: 6.1 % — SIGNIFICANT CHANGE UP (ref 1.7–9.3)
NEUTROPHILS # BLD AUTO: 6.26 K/UL — SIGNIFICANT CHANGE UP (ref 1.4–6.5)
NEUTROPHILS NFR BLD AUTO: 70.5 % — SIGNIFICANT CHANGE UP (ref 42.2–75.2)
NITRITE UR-MCNC: NEGATIVE — SIGNIFICANT CHANGE UP
NRBC # BLD: 0 /100 WBCS — SIGNIFICANT CHANGE UP (ref 0–0)
PH UR: 6 — SIGNIFICANT CHANGE UP (ref 5–8)
PLATELET # BLD AUTO: 370 K/UL — SIGNIFICANT CHANGE UP (ref 130–400)
POTASSIUM SERPL-MCNC: 3.8 MMOL/L — SIGNIFICANT CHANGE UP (ref 3.5–5)
POTASSIUM SERPL-SCNC: 3.8 MMOL/L — SIGNIFICANT CHANGE UP (ref 3.5–5)
PROT SERPL-MCNC: 7.3 G/DL — SIGNIFICANT CHANGE UP (ref 6–8)
PROT UR-MCNC: ABNORMAL
RBC # BLD: 4.3 M/UL — SIGNIFICANT CHANGE UP (ref 4.2–5.4)
RBC # FLD: 13.4 % — SIGNIFICANT CHANGE UP (ref 11.5–14.5)
RBC CASTS # UR COMP ASSIST: 6 /HPF — HIGH (ref 0–4)
SODIUM SERPL-SCNC: 136 MMOL/L — SIGNIFICANT CHANGE UP (ref 135–146)
SP GR SPEC: 1.03 — HIGH (ref 1.01–1.03)
UROBILINOGEN FLD QL: SIGNIFICANT CHANGE UP
WBC # BLD: 8.88 K/UL — SIGNIFICANT CHANGE UP (ref 4.8–10.8)
WBC # FLD AUTO: 8.88 K/UL — SIGNIFICANT CHANGE UP (ref 4.8–10.8)
WBC UR QL: 6 /HPF — HIGH (ref 0–5)

## 2022-03-07 PROCEDURE — 99285 EMERGENCY DEPT VISIT HI MDM: CPT

## 2022-03-07 PROCEDURE — 76830 TRANSVAGINAL US NON-OB: CPT | Mod: 26

## 2022-03-07 RX ORDER — ONDANSETRON 8 MG/1
4 TABLET, FILM COATED ORAL ONCE
Refills: 0 | Status: COMPLETED | OUTPATIENT
Start: 2022-03-07 | End: 2022-03-07

## 2022-03-07 RX ORDER — SODIUM CHLORIDE 9 MG/ML
1000 INJECTION INTRAMUSCULAR; INTRAVENOUS; SUBCUTANEOUS ONCE
Refills: 0 | Status: COMPLETED | OUTPATIENT
Start: 2022-03-07 | End: 2022-03-07

## 2022-03-07 RX ORDER — CEPHALEXIN 500 MG
1 CAPSULE ORAL
Qty: 28 | Refills: 0
Start: 2022-03-07 | End: 2022-03-13

## 2022-03-07 RX ORDER — ONDANSETRON 8 MG/1
1 TABLET, FILM COATED ORAL
Qty: 15 | Refills: 0
Start: 2022-03-07 | End: 2022-03-11

## 2022-03-07 RX ADMIN — SODIUM CHLORIDE 1000 MILLILITER(S): 9 INJECTION INTRAMUSCULAR; INTRAVENOUS; SUBCUTANEOUS at 21:37

## 2022-03-07 RX ADMIN — ONDANSETRON 4 MILLIGRAM(S): 8 TABLET, FILM COATED ORAL at 20:33

## 2022-03-07 RX ADMIN — SODIUM CHLORIDE 1000 MILLILITER(S): 9 INJECTION INTRAMUSCULAR; INTRAVENOUS; SUBCUTANEOUS at 20:33

## 2022-03-07 NOTE — ED PROVIDER NOTE - NSICDXPASTMEDICALHX_GEN_ALL_CORE_FT
PAST MEDICAL HISTORY:  Chronic hypertension on aldomet 500 mg bid since 2018    Graves disease remission since this current pregnancy; no medications for graves since 7weeks

## 2022-03-07 NOTE — ED PROVIDER NOTE - OBJECTIVE STATEMENT
35 y/o female with hx Depression,  LMP 22 currently pregnant presents to the ED c/o "I've been having bad nausea and vomiting for the last 3 weeks. My doctor started me on diclegis and it is not helping. I have been vomiting all day and I can't keep anything down." no abd pain/ vaginal bleeding/ urinary symptoms

## 2022-03-07 NOTE — ED PROVIDER NOTE - CARE PROVIDER_API CALL
Jeremy Smith)  Obstetrics and Gynecology  [address] [place] [address]  Phone: [phone]  Fax: [phone]  Follow Up Time:

## 2022-03-07 NOTE — ED ADULT NURSE NOTE - NSIMPLEMENTINTERV_GEN_ALL_ED
Implemented All Universal Safety Interventions:  [place] to call system. Call bell, personal items and telephone within reach. Instruct patient to call for assistance. Room bathroom lighting operational. Non-slip footwear when patient is off stretcher. Physically safe environment: no spills, clutter or unnecessary equipment. Stretcher in lowest position, wheels locked, appropriate side rails in place.

## 2022-03-07 NOTE — ED PROVIDER NOTE - CLINICAL SUMMARY MEDICAL DECISION MAKING FREE TEXT BOX
36-year-old female presenting for nausea vomiting for the past 3 weeks getting progressively worse.  States that she is currently 8 weeks pregnant.  Has been trying doxylamine, with no improvement.  Well appearing, NAD, non toxic. NCAT PERRLA EOMI neck supple non tender normal wob cta bl rrr abdomen s nt nd no rebound no guarding WWPx4 neuro non focal. Pt feeling improved, tolerating PO, abdomen soft, non-tender, non-distended, no rebound, no guarding. Aware of all results, given a copy of all available results, comfortable with discharge and follow-up outpatient, strict return precautions given. Endorses understanding of all of this and aware that they can return at any time for new or concerning symptoms. No further questions or concerns at this time

## 2022-03-07 NOTE — ED ADULT NURSE NOTE - OBJECTIVE STATEMENT
Pt c/o N/V, unable to keep food down for three weeks. Pt is 8 weeks pregnant denies any abdominal pain, or vaginal bleeding

## 2022-03-07 NOTE — ED PROVIDER NOTE - NSFOLLOWUPINSTRUCTIONS_ED_ALL_ED_FT
Urinary Tract Infection    A urinary tract infection (UTI) is an infection of any part of the urinary tract, which includes the kidneys, ureters, bladder, and urethra. Risk factors include ignoring your need to urinate, wiping back to front if female, being an uncircumcised male, and having diabetes or a weak immune system. Symptoms include frequent urination, pain or burning with urination, foul smelling urine, cloudy urine, pain in the lower abdomen, blood in the urine, and fever. If you were prescribed an antibiotic medicine, take it as told by your health care provider. Do not stop taking the antibiotic even if you start to feel better.    SEEK IMMEDIATE MEDICAL CARE IF YOU HAVE THE FOLLOWING SYMPTOMS: severe back or abdominal pain, inability to keep fluids or medicine down, dizziness/lightheadedness, or a change in mental status.          Nausea / Vomiting    Nausea is the feeling that you have an upset stomach or have to vomit. As nausea gets worse, it can lead to vomiting. Vomiting occurs when stomach contents are thrown up and out of the mouth which puts you at an increased risk for dehydration. Older adults and people with other diseases or a weak immune system are at higher risk for dehydration. Drink clear fluids in small but frequent amounts as tolerated. Eat bland, easy-to-digest foods in small amounts as tolerated.     SEEK IMMEDIATE MEDICAL CARE IF YOU HAVE THE FOLLOWING SYMPTOMS: fever, inability to keep fluids down, black or bloody vomitus, black or bloody stools, lightheadedness/dizziness, chest pain, severe headache, rash, shortness of breath, cold or clammy skin, confusion, pain with urination, or any signs of dehydration.

## 2022-03-07 NOTE — ED PROVIDER NOTE - CARE PLAN
1 Principal Discharge DX:	Nausea and vomiting in pregnancy  Secondary Diagnosis:	Asymptomatic bacteriuria

## 2022-03-07 NOTE — ED PROVIDER NOTE - PATIENT PORTAL LINK FT
You can access the FollowMyHealth Patient Portal offered by [facility] by registering at the following website: [email or web address]. By joining [facility]’s FollowMyHealth portal, you will also be able to view your health information using other applications (apps) compatible with our system.

## 2022-03-08 RX ORDER — ONDANSETRON 8 MG/1
4 TABLET, FILM COATED ORAL ONCE
Refills: 0 | Status: COMPLETED | OUTPATIENT
Start: 2022-03-08 | End: 2022-03-08

## 2022-03-08 RX ORDER — CEPHALEXIN 500 MG
500 CAPSULE ORAL ONCE
Refills: 0 | Status: COMPLETED | OUTPATIENT
Start: 2022-03-08 | End: 2022-03-08

## 2022-03-08 RX ORDER — ONDANSETRON 8 MG/1
4 TABLET, FILM COATED ORAL ONCE
Refills: 0 | Status: DISCONTINUED | OUTPATIENT
Start: 2022-03-08 | End: 2022-03-08

## 2022-03-08 RX ADMIN — Medication 500 MILLIGRAM(S): at 00:48

## 2022-03-08 RX ADMIN — ONDANSETRON 4 MILLIGRAM(S): 8 TABLET, FILM COATED ORAL at 00:48

## 2022-04-06 ENCOUNTER — APPOINTMENT (OUTPATIENT)
Dept: ANTEPARTUM | Facility: CLINIC | Age: 37
End: 2022-04-06
Payer: COMMERCIAL

## 2022-04-06 ENCOUNTER — ASOB RESULT (OUTPATIENT)
Age: 37
End: 2022-04-06

## 2022-04-06 VITALS
HEIGHT: 64 IN | DIASTOLIC BLOOD PRESSURE: 85 MMHG | WEIGHT: 200 LBS | HEART RATE: 72 BPM | SYSTOLIC BLOOD PRESSURE: 140 MMHG | BODY MASS INDEX: 34.15 KG/M2

## 2022-04-06 VITALS
WEIGHT: 200 LBS | HEIGHT: 64 IN | HEART RATE: 86 BPM | BODY MASS INDEX: 34.15 KG/M2 | DIASTOLIC BLOOD PRESSURE: 85 MMHG | SYSTOLIC BLOOD PRESSURE: 140 MMHG

## 2022-04-06 PROCEDURE — 76813 OB US NUCHAL MEAS 1 GEST: CPT | Mod: 52

## 2022-04-12 ENCOUNTER — APPOINTMENT (OUTPATIENT)
Dept: ANTEPARTUM | Facility: CLINIC | Age: 37
End: 2022-04-12
Payer: COMMERCIAL

## 2022-04-12 ENCOUNTER — ASOB RESULT (OUTPATIENT)
Age: 37
End: 2022-04-12

## 2022-04-12 ENCOUNTER — APPOINTMENT (OUTPATIENT)
Dept: MATERNAL FETAL MEDICINE | Facility: CLINIC | Age: 37
End: 2022-04-12
Payer: COMMERCIAL

## 2022-04-12 VITALS
DIASTOLIC BLOOD PRESSURE: 80 MMHG | HEART RATE: 105 BPM | WEIGHT: 210 LBS | HEIGHT: 64 IN | SYSTOLIC BLOOD PRESSURE: 150 MMHG | BODY MASS INDEX: 35.85 KG/M2

## 2022-04-12 VITALS — DIASTOLIC BLOOD PRESSURE: 80 MMHG | SYSTOLIC BLOOD PRESSURE: 140 MMHG | HEART RATE: 98 BPM

## 2022-04-12 DIAGNOSIS — F41.1 GENERALIZED ANXIETY DISORDER: ICD-10-CM

## 2022-04-12 DIAGNOSIS — Z3A.30 30 WEEKS GESTATION OF PREGNANCY: ICD-10-CM

## 2022-04-12 DIAGNOSIS — Z86.79 PERSONAL HISTORY OF OTHER DISEASES OF THE CIRCULATORY SYSTEM: ICD-10-CM

## 2022-04-12 DIAGNOSIS — O21.0 MILD HYPEREMESIS GRAVIDARUM: ICD-10-CM

## 2022-04-12 PROCEDURE — ZZZZZ: CPT

## 2022-04-12 PROCEDURE — 99215 OFFICE O/P EST HI 40 MIN: CPT | Mod: 25

## 2022-04-12 PROCEDURE — 76813 OB US NUCHAL MEAS 1 GEST: CPT

## 2022-04-12 RX ORDER — ONDANSETRON 8 MG/1
8 TABLET, ORALLY DISINTEGRATING ORAL
Refills: 0 | Status: ACTIVE | COMMUNITY
Start: 2022-04-12

## 2022-04-12 RX ORDER — SERTRALINE HYDROCHLORIDE 50 MG/1
50 TABLET, FILM COATED ORAL DAILY
Refills: 0 | Status: ACTIVE | COMMUNITY
Start: 2022-04-12

## 2022-04-12 NOTE — OB HISTORY
[LMP: ___] : LMP: [unfilled] [WHIT: ___] : WHIT: [unfilled] [EGA: ___ wks] : EGA: [unfilled] wks [Sonogram] : sonogram [at ___ wks] : at [unfilled] weeks [Pregnancy History] : patient received anesthesia [___] : at [unfilled] weeks GA [FreeTextEntry1] : Patient has had hypertension requiring medication in each pregnancy. Has not needed medication when not pregnant. Was started on Nifedipine ER 30 mg daily early in pregnancy. BP today 140 - 150/80.\par Has a primary care physician. Had normal evaluation in September 2021.\par Discussed essential hypertension and its effects on pregnancy. Importance of monitoring BP at home as well as end organ damage discussed.\par Father of the baby has hypochondroplasia as does their first son. Has been evaluated by Dr. Sanchez at [facility]. She is not interested in prenatal diagnosis because she would not terminate a pregnancy for this diagnosis. She understands this is an autosomal dominant condition and that each of their children will have a 50 % risk of the disease.\par Patient to call if BP > 160/110.\par Will monitor fetal growth every 4 weeks. Patient understands that this is not a very effective way to screen for hypochondroplasia because growth lag develops later in childhood. She had the opportunity to ask questions and they were answered.

## 2022-04-26 ENCOUNTER — TRANSCRIPTION ENCOUNTER (OUTPATIENT)
Age: 37
End: 2022-04-26

## 2022-04-26 ENCOUNTER — INPATIENT (INPATIENT)
Facility: HOSPITAL | Age: 37
LOS: 0 days | Discharge: HOME | End: 2022-04-26
Attending: OBSTETRICS & GYNECOLOGY | Admitting: OBSTETRICS & GYNECOLOGY
Payer: COMMERCIAL

## 2022-04-26 ENCOUNTER — ASOB RESULT (OUTPATIENT)
Age: 37
End: 2022-04-26

## 2022-04-26 ENCOUNTER — APPOINTMENT (OUTPATIENT)
Dept: ANTEPARTUM | Facility: CLINIC | Age: 37
End: 2022-04-26
Payer: COMMERCIAL

## 2022-04-26 VITALS
HEART RATE: 100 BPM | SYSTOLIC BLOOD PRESSURE: 115 MMHG | DIASTOLIC BLOOD PRESSURE: 68 MMHG | RESPIRATION RATE: 18 BRPM | OXYGEN SATURATION: 98 % | TEMPERATURE: 98 F

## 2022-04-26 VITALS
WEIGHT: 208 LBS | HEART RATE: 167 BPM | SYSTOLIC BLOOD PRESSURE: 180 MMHG | DIASTOLIC BLOOD PRESSURE: 100 MMHG | BODY MASS INDEX: 35.51 KG/M2 | HEIGHT: 64 IN

## 2022-04-26 VITALS
SYSTOLIC BLOOD PRESSURE: 134 MMHG | TEMPERATURE: 98 F | HEIGHT: 64 IN | DIASTOLIC BLOOD PRESSURE: 88 MMHG | HEART RATE: 160 BPM | OXYGEN SATURATION: 99 % | RESPIRATION RATE: 18 BRPM

## 2022-04-26 DIAGNOSIS — Z90.89 ACQUIRED ABSENCE OF OTHER ORGANS: Chronic | ICD-10-CM

## 2022-04-26 DIAGNOSIS — Z98.890 OTHER SPECIFIED POSTPROCEDURAL STATES: Chronic | ICD-10-CM

## 2022-04-26 DIAGNOSIS — Z90.49 ACQUIRED ABSENCE OF OTHER SPECIFIED PARTS OF DIGESTIVE TRACT: Chronic | ICD-10-CM

## 2022-04-26 LAB
ALBUMIN SERPL ELPH-MCNC: 4.5 G/DL — SIGNIFICANT CHANGE UP (ref 3.5–5.2)
ALP SERPL-CCNC: 67 U/L — SIGNIFICANT CHANGE UP (ref 30–115)
ALT FLD-CCNC: 10 U/L — SIGNIFICANT CHANGE UP (ref 0–41)
ANION GAP SERPL CALC-SCNC: 15 MMOL/L — HIGH (ref 7–14)
APPEARANCE UR: CLEAR — SIGNIFICANT CHANGE UP
AST SERPL-CCNC: 12 U/L — SIGNIFICANT CHANGE UP (ref 0–41)
BASE EXCESS BLDV CALC-SCNC: -0.8 MMOL/L — SIGNIFICANT CHANGE UP (ref -2–3)
BASOPHILS # BLD AUTO: 0.03 K/UL — SIGNIFICANT CHANGE UP (ref 0–0.2)
BASOPHILS NFR BLD AUTO: 0.3 % — SIGNIFICANT CHANGE UP (ref 0–1)
BILIRUB SERPL-MCNC: <0.2 MG/DL — SIGNIFICANT CHANGE UP (ref 0.2–1.2)
BILIRUB UR-MCNC: NEGATIVE — SIGNIFICANT CHANGE UP
BLD GP AB SCN SERPL QL: SIGNIFICANT CHANGE UP
BUN SERPL-MCNC: 11 MG/DL — SIGNIFICANT CHANGE UP (ref 10–20)
CA-I SERPL-SCNC: 1.2 MMOL/L — SIGNIFICANT CHANGE UP (ref 1.15–1.33)
CALCIUM SERPL-MCNC: 10.1 MG/DL — SIGNIFICANT CHANGE UP (ref 8.5–10.1)
CHLORIDE SERPL-SCNC: 104 MMOL/L — SIGNIFICANT CHANGE UP (ref 98–110)
CO2 SERPL-SCNC: 20 MMOL/L — SIGNIFICANT CHANGE UP (ref 17–32)
COLOR SPEC: COLORLESS — SIGNIFICANT CHANGE UP
CREAT SERPL-MCNC: 0.6 MG/DL — LOW (ref 0.7–1.5)
DIFF PNL FLD: NEGATIVE — SIGNIFICANT CHANGE UP
EGFR: 120 ML/MIN/1.73M2 — SIGNIFICANT CHANGE UP
EOSINOPHIL # BLD AUTO: 0.03 K/UL — SIGNIFICANT CHANGE UP (ref 0–0.7)
EOSINOPHIL NFR BLD AUTO: 0.3 % — SIGNIFICANT CHANGE UP (ref 0–8)
GAS PNL BLDV: 133 MMOL/L — LOW (ref 136–145)
GAS PNL BLDV: SIGNIFICANT CHANGE UP
GLUCOSE SERPL-MCNC: 94 MG/DL — SIGNIFICANT CHANGE UP (ref 70–99)
GLUCOSE UR QL: NEGATIVE — SIGNIFICANT CHANGE UP
HCG SERPL-ACNC: HIGH MIU/ML
HCO3 BLDV-SCNC: 24 MMOL/L — SIGNIFICANT CHANGE UP (ref 22–29)
HCT VFR BLD CALC: 38.9 % — SIGNIFICANT CHANGE UP (ref 37–47)
HCT VFR BLDA CALC: 42 % — SIGNIFICANT CHANGE UP (ref 39–51)
HGB BLD CALC-MCNC: 13.9 G/DL — SIGNIFICANT CHANGE UP (ref 12.6–17.4)
HGB BLD-MCNC: 12.8 G/DL — SIGNIFICANT CHANGE UP (ref 12–16)
IMM GRANULOCYTES NFR BLD AUTO: 0.3 % — SIGNIFICANT CHANGE UP (ref 0.1–0.3)
KETONES UR-MCNC: NEGATIVE — SIGNIFICANT CHANGE UP
LACTATE BLDV-MCNC: 1.5 MMOL/L — SIGNIFICANT CHANGE UP (ref 0.5–2)
LEUKOCYTE ESTERASE UR-ACNC: NEGATIVE — SIGNIFICANT CHANGE UP
LYMPHOCYTES # BLD AUTO: 1.5 K/UL — SIGNIFICANT CHANGE UP (ref 1.2–3.4)
LYMPHOCYTES # BLD AUTO: 15 % — LOW (ref 20.5–51.1)
MAGNESIUM SERPL-MCNC: 2 MG/DL — SIGNIFICANT CHANGE UP (ref 1.8–2.4)
MCHC RBC-ENTMCNC: 28.6 PG — SIGNIFICANT CHANGE UP (ref 27–31)
MCHC RBC-ENTMCNC: 32.9 G/DL — SIGNIFICANT CHANGE UP (ref 32–37)
MCV RBC AUTO: 87 FL — SIGNIFICANT CHANGE UP (ref 81–99)
MONOCYTES # BLD AUTO: 0.68 K/UL — HIGH (ref 0.1–0.6)
MONOCYTES NFR BLD AUTO: 6.8 % — SIGNIFICANT CHANGE UP (ref 1.7–9.3)
NEUTROPHILS # BLD AUTO: 7.76 K/UL — HIGH (ref 1.4–6.5)
NEUTROPHILS NFR BLD AUTO: 77.3 % — HIGH (ref 42.2–75.2)
NITRITE UR-MCNC: NEGATIVE — SIGNIFICANT CHANGE UP
NRBC # BLD: 0 /100 WBCS — SIGNIFICANT CHANGE UP (ref 0–0)
NT-PROBNP SERPL-SCNC: 21 PG/ML — SIGNIFICANT CHANGE UP (ref 0–300)
PCO2 BLDV: 41 MMHG — SIGNIFICANT CHANGE UP (ref 39–42)
PH BLDV: 7.38 — SIGNIFICANT CHANGE UP (ref 7.32–7.43)
PH UR: 6.5 — SIGNIFICANT CHANGE UP (ref 5–8)
PLATELET # BLD AUTO: 378 K/UL — SIGNIFICANT CHANGE UP (ref 130–400)
PO2 BLDV: 41 MMHG — SIGNIFICANT CHANGE UP
POTASSIUM BLDV-SCNC: 3.8 MMOL/L — SIGNIFICANT CHANGE UP (ref 3.5–5.1)
POTASSIUM SERPL-MCNC: 4.2 MMOL/L — SIGNIFICANT CHANGE UP (ref 3.5–5)
POTASSIUM SERPL-SCNC: 4.2 MMOL/L — SIGNIFICANT CHANGE UP (ref 3.5–5)
PROT SERPL-MCNC: 7.6 G/DL — SIGNIFICANT CHANGE UP (ref 6–8)
PROT UR-MCNC: NEGATIVE — SIGNIFICANT CHANGE UP
RBC # BLD: 4.47 M/UL — SIGNIFICANT CHANGE UP (ref 4.2–5.4)
RBC # FLD: 13.3 % — SIGNIFICANT CHANGE UP (ref 11.5–14.5)
SAO2 % BLDV: 67.4 % — SIGNIFICANT CHANGE UP
SARS-COV-2 RNA SPEC QL NAA+PROBE: SIGNIFICANT CHANGE UP
SODIUM SERPL-SCNC: 139 MMOL/L — SIGNIFICANT CHANGE UP (ref 135–146)
SP GR SPEC: 1 — LOW (ref 1.01–1.03)
TROPONIN T SERPL-MCNC: <0.01 NG/ML — SIGNIFICANT CHANGE UP
UROBILINOGEN FLD QL: SIGNIFICANT CHANGE UP
WBC # BLD: 10.03 K/UL — SIGNIFICANT CHANGE UP (ref 4.8–10.8)
WBC # FLD AUTO: 10.03 K/UL — SIGNIFICANT CHANGE UP (ref 4.8–10.8)

## 2022-04-26 PROCEDURE — 93010 ELECTROCARDIOGRAM REPORT: CPT

## 2022-04-26 PROCEDURE — 76815 OB US LIMITED FETUS(S): CPT | Mod: 26

## 2022-04-26 PROCEDURE — 99214 OFFICE O/P EST MOD 30 MIN: CPT

## 2022-04-26 PROCEDURE — 93306 TTE W/DOPPLER COMPLETE: CPT | Mod: 26

## 2022-04-26 PROCEDURE — 99285 EMERGENCY DEPT VISIT HI MDM: CPT | Mod: 25

## 2022-04-26 PROCEDURE — 93308 TTE F-UP OR LMTD: CPT | Mod: 26

## 2022-04-26 PROCEDURE — 76816 OB US FOLLOW-UP PER FETUS: CPT

## 2022-04-26 RX ORDER — LABETALOL HCL 100 MG
100 TABLET ORAL
Refills: 0 | Status: DISCONTINUED | OUTPATIENT
Start: 2022-04-26 | End: 2022-04-26

## 2022-04-26 RX ORDER — SODIUM CHLORIDE 9 MG/ML
1000 INJECTION, SOLUTION INTRAVENOUS ONCE
Refills: 0 | Status: COMPLETED | OUTPATIENT
Start: 2022-04-26 | End: 2022-04-26

## 2022-04-26 RX ORDER — ACETAMINOPHEN 500 MG
975 TABLET ORAL ONCE
Refills: 0 | Status: COMPLETED | OUTPATIENT
Start: 2022-04-26 | End: 2022-04-26

## 2022-04-26 RX ORDER — MAGNESIUM SULFATE 500 MG/ML
2 VIAL (ML) INJECTION ONCE
Refills: 0 | Status: COMPLETED | OUTPATIENT
Start: 2022-04-26 | End: 2022-04-26

## 2022-04-26 RX ORDER — LABETALOL HCL 100 MG
1 TABLET ORAL
Qty: 0 | Refills: 0 | DISCHARGE
Start: 2022-04-26

## 2022-04-26 RX ADMIN — Medication 975 MILLIGRAM(S): at 15:11

## 2022-04-26 RX ADMIN — Medication 975 MILLIGRAM(S): at 14:28

## 2022-04-26 RX ADMIN — SODIUM CHLORIDE 1000 MILLILITER(S): 9 INJECTION, SOLUTION INTRAVENOUS at 14:35

## 2022-04-26 RX ADMIN — SODIUM CHLORIDE 1000 MILLILITER(S): 9 INJECTION, SOLUTION INTRAVENOUS at 13:24

## 2022-04-26 RX ADMIN — Medication 2 GRAM(S): at 15:23

## 2022-04-26 RX ADMIN — Medication 25 GRAM(S): at 14:19

## 2022-04-26 NOTE — ED ADULT TRIAGE NOTE - CHIEF COMPLAINT QUOTE
pt 15 week pregnant. just started on nifedipine. pt has been hauseas and unable to keep pills down but when she is able to keep the medication down shes getting palpitations

## 2022-04-26 NOTE — CONSULT NOTE ADULT - SUBJECTIVE AND OBJECTIVE BOX
Chief Complaint: tachycardia     HPI: 35yo  at 15w4d GA dated by LMP 2022 presented to the ED after she was seen at the outpatient [facility] office for routine ultrasound when she was noted to have abnormal vitals. In the office, she had a heart rate of 160bpm and a systolic blood pressure in the 180's.   Reports that when she was seen in the outpatient office she felt like her heart was racing. Denied dizziness, lightheadedness, weakness, chest pain, or shortness of breath.   Denies abdominal pain, vaginal bleeding, or leakage of fluid.    Pregnancy complicated by:  -cHTN. Currently on Procardia 30XL daily, last dose at 0930. Reports her blood pressures are normally 130's/80's. Reports she has had blood pressure issues during all of her pregnancies and was normally on methyl-dopa.   -tachycardia. Reports h/o tachycardia after one epidural and post-op after a dignostic laparoscopy for endometriosis. S/p cardiology work-up with Dr. Albert and per patient it was normal.     Location -  Severity -  Quality -  Duration -  Timing -   Modifying Factors -   Associated Signs/Symptoms -     Ob/Gyn History:  G P                 LMP -                   Cycle Length -   Denies history of ovarian cysts, uterine fibroids, abnormal paps, or STIs  Last Pap Smear -   Last Mammogram -   Last Colonoscopy -        Denies the following: constitutional symptoms, visual symptoms, cardiovascular symptoms, respiratory symptoms, GI symptoms, musculoskeletal symptoms, skin symptoms, neurologic symptoms, hematologic symptoms, allergic symptoms, psychiatric symptoms  Except any pertinent positives listed.     Home Medications:  acetaminophen 325 mg oral tablet: 3 tab(s) orally every 6 hours, As Needed (19 May 2019 11:01)  docusate sodium 100 mg oral capsule: 1 cap(s) orally 2 times a day, As needed, For stool softening (19 May 2019 10:13)  ibuprofen 600 mg oral tablet: 1 tab(s) orally every 6 hours, As Needed (19 May 2019 11:01)  methyldopa 500 mg oral tablet: 1 tab(s) orally every 12 hours (19 May 2019 10:17)      Allergies    No Known Allergies    Intolerances        PAST MEDICAL & SURGICAL HISTORY:  Graves disease  remission since this current pregnancy; no medications for graves since 7weeks    Chronic hypertension  on aldomet 500 mg bid since 2018    History of appendectomy    H/O laparoscopy    History of tonsillectomy        FAMILY HISTORY:      SOCIAL HISTORY: Denies cigarette use, alcohol use, or illicit drug use    Vital Signs Last 24 Hrs  T(F): 98.2 (2022 12:41), Max: 98.2 (2022 12:41)  HR: 105 (2022 13:24) (105 - 160)  BP: 133/101 (2022 13:24) (133/101 - 134/88)  RR: 18 (2022 12:41) (18 - 18)    General Appearance - AAOx3, NAD  Heart - S1S2 regular rate and rhythm  Lung - CTA Bilaterally  Abdomen - Soft, nontender, nondistended, no rebound, no rigidity, no guarding, bowel sounds present    GYN/Pelvis:    Labia Majora - Normal  Labia Minora - Normal  Clitoris - Normal  Urethra - Normal  Vagina - Normal  Cervix - Normal    Uterus:  Size - Normal  Tenderness - None  Mass - None  Freely mobile    Adnexa:  Masses - None  Tenderness - None      LABS:                        12.8   10.03 )-----------( 378      ( 2022 13:04 )             38.9         -    139  |  104  |  11  ----------------------------<  94  4.2   |  20  |  0.6<L>    Ca    10.1      2022 13:04  Mg     2.0         TPro  7.6  /  Alb  4.5  /  TBili  <0.2  /  DBili  x   /  AST  12  /  ALT  10  /  AlkPhos  67  -      Urinalysis Basic - ( 2022 13:00 )    Color: Colorless / Appearance: Clear / S.005 / pH: x  Gluc: x / Ketone: Negative  / Bili: Negative / Urobili: <2 mg/dL   Blood: x / Protein: Negative / Nitrite: Negative   Leuk Esterase: Negative / RBC: x / WBC x   Sq Epi: x / Non Sq Epi: x / Bacteria: x          RADIOLOGY & ADDITIONAL STUDIES:   Chief Complaint: tachycardia     HPI: 37yo  at 15w4d GA dated by LMP 2022 presented to the ED after she was seen at the outpatient [facility] office for routine ultrasound when she was noted to have abnormal vitals. In the office, she had a heart rate of 160bpm and a systolic blood pressure in the 180's.   Reports that when she was seen in the outpatient office she felt like her heart was racing. Denied dizziness, lightheadedness, weakness, chest pain, or shortness of breath.   Denies abdominal pain, vaginal bleeding, or leakage of fluid.    Pregnancy complicated by:  -cHTN. Currently on Procardia 30XL daily, last dose at 0930. Reports her blood pressures are normally 130's/80's. Reports she has had blood pressure issues during all of her pregnancies and was normally on methyl-dopa.   -tachycardia. Reports h/o tachycardia after one epidural and post-op after a dignostic laparoscopy for endometriosis. S/p cardiology work-up with Dr. Albert and per patient it was normal  -graves disease, in remission per patient, not on medications    Ob/Gyn History:     2 FT , largest 6-12; both pregnancies complicated by cHTN on methyldopa   1 SAB no D&C                      LMP - 2022                  Cycle Length - monthly  H/o endometriosis. Denies history of ovarian cysts, uterine fibroids, abnormal paps, or STIs  Last Pap Smear - , normal per patient     Denies the following: constitutional symptoms, visual symptoms, cardiovascular symptoms, respiratory symptoms, GI symptoms, musculoskeletal symptoms, skin symptoms, neurologic symptoms, hematologic symptoms, allergic symptoms, psychiatric symptoms  Except any pertinent positives listed.     Home Medications:  acetaminophen 325 mg oral tablet: 3 tab(s) orally every 6 hours, As Needed (19 May 2019 11:01)  docusate sodium 100 mg oral capsule: 1 cap(s) orally 2 times a day, As needed, For stool softening (19 May 2019 10:13)  ibuprofen 600 mg oral tablet: 1 tab(s) orally every 6 hours, As Needed (19 May 2019 11:01)  methyldopa 500 mg oral tablet: 1 tab(s) orally every 12 hours (19 May 2019 10:17)      Allergies    No Known Allergies    Intolerances        PAST MEDICAL & SURGICAL HISTORY:  Graves disease  remission since this current pregnancy; no medications for graves since 7weeks  Chronic hypertensionon currently on ProCardia 30XL daily (was previously on aldomet 500 mg BID)  History of appendectomy  H/O laparoscopy  History of tonsillectomy        FAMILY HISTORY: Denies relevant family history       SOCIAL HISTORY: Denies cigarette use, alcohol use, or illicit drug use    Vital Signs Last 24 Hrs  T(F): 98.2 (2022 12:41), Max: 98.2 (2022 12:41)  HR: 105 (2022 13:24) (105 - 160)  BP: 133/101 (2022 13:24) (133/101 - 134/88)  RR: 18 (2022 12:41) (18 - 18)    General Appearance - AAOx3, NAD  Heart - S1S2 regular rate and rhythm  Lung - CTA Bilaterally  Abdomen - Soft, nontender, nondistended, no rebound, no rigidity, no guarding, bowel sounds present, gravid, no palpable contractions, no fundal tenderness     GYN/Pelvis: Deferred, no obstetrical complaints      LABS:                        12.8   10.03 )-----------( 378      ( 2022 13:04 )             38.9             139  |  104  |  11  ----------------------------<  94  4.2   |  20  |  0.6<L>    Ca    10.1      2022 13:04  Mg     2.0         TPro  7.6  /  Alb  4.5  /  TBili  <0.2  /  DBili  x   /  AST  12  /  ALT  10  /  AlkPhos  67        Urinalysis Basic - ( 2022 13:00 )    Color: Colorless / Appearance: Clear / S.005 / pH: x  Gluc: x / Ketone: Negative  / Bili: Negative / Urobili: <2 mg/dL   Blood: x / Protein: Negative / Nitrite: Negative   Leuk Esterase: Negative / RBC: x / WBC x   Sq Epi: x / Non Sq Epi: x / Bacteria: x    < from: 12 Lead ECG (22 @ 12:43) >    Ventricular Rate 142 BPM    Atrial Rate 142 BPM    P-R Interval 120 ms    QRS Duration 72 ms    Q-T Interval 354 ms    QTC Calculation(Bazett) 544 ms    P Axis 57 degrees    R Axis 90 degrees    T Axis 42 degrees    Diagnosis Line Sinus tachycardia  Possible Right ventricular hypertrophy  Nonspecific T wave abnormality  Abnormal ECG    Confirmed by FRANCISCO NICOLE MD ([id number]) on 2022 2:20:37 PM    < end of copied text >        RADIOLOGY & ADDITIONAL STUDIES:   BSS:     Bedside ECHO by ED personnel: Grossly Normal Function, tachycardic

## 2022-04-26 NOTE — ED ADULT NURSE NOTE - NSSUHOSCREENINGYN_ED_ALL_ED
Received and left supine in bed on [address] by nurses' station. Yes - the patient is able to be screened

## 2022-04-26 NOTE — DISCHARGE NOTE PROVIDER - NSDCCPCAREPLAN_GEN_ALL_CORE_FT
PRINCIPAL DISCHARGE DIAGNOSIS  Diagnosis: Sinus tachycardia  Assessment and Plan of Treatment:

## 2022-04-26 NOTE — CONSULT NOTE ADULT - ASSESSMENT
A/P: 37yo  at 15w4d GA dated by LMP 2022, Rh pos, with chronic hypertension on ProCardioa 30XL, with severely elevated blood pressure and tachycardia in the outpatient clinic   -recommend CBC, CMP, Mg, phos, BNP, TSH with free T4  -recommend official ECHO   -recommend EKG   -recommend gentle fluid hydration with 1L of LR   -recommend cardiology consult   -will consider changing antihypertensive medications based upon cardiology recommendations       Dr. Smith aware A/P: 35yo  at 15w4d GA dated by LMP 2022, Rh pos, with chronic hypertension on ProCardioa 30XL, with severely elevated blood pressure and tachycardia in the outpatient clinic   -recommend CBC, CMP, Mg, phos, BNP, TSH with free T4  -recommend official ECHO   -recommend EKG   -recommend gentle fluid hydration with 1L of LR   -recommend cardiology consult   -M consult  -will consider changing antihypertensive medications based upon cardiology recommendations       Dr. Smith aware A/P: 35yo  at 15w4d GA dated by LMP 2022, Rh pos, with chronic hypertension on ProCardia 30XL, with severely elevated blood pressure and tachycardia in the outpatient clinic   -recommend CBC, CMP, Mg, phos, BNP, TSH with free T4  -recommend official ECHO   -recommend EKG   -recommend gentle fluid hydration with 1L of LR   -recommend cardiology consult   -M consult  -will consider changing antihypertensive medications based upon cardiology recommendations       Dr. Smith aware

## 2022-04-26 NOTE — ED PROVIDER NOTE - CLINICAL SUMMARY MEDICAL DECISION MAKING FREE TEXT BOX
Pregnant.  Palpitations.  No shortness of breath.  No chest pain.  Exam is normal.  Except for tachycardia.  Labs reviewed.  GYN consulted.  Heart rate improving with IV fluids.

## 2022-04-26 NOTE — OB PROVIDER H&P - HISTORY OF PRESENT ILLNESS
35yo  at 15w4d GA dated by LMP 2022 presented to the ED after she was seen at the outpatient [facility] office for routine ultrasound when she was noted to have abnormal vitals. In the office, she had a heart rate of 160bpm and a systolic blood pressure in the 180's.   Reports that when she was seen in the outpatient office she felt like her heart was racing. Denied dizziness, lightheadedness, weakness, chest pain, or shortness of breath.   Denies abdominal pain, vaginal bleeding, or leakage of fluid.    Pregnancy complicated by:  -cHTN. Currently on Procardia 30XL daily, last dose at 0930. Reports her blood pressures are normally 130's/80's. Reports she has had blood pressure issues during all of her pregnancies and was normally on methyl-dopa.   -tachycardia. Reports h/o tachycardia after one epidural and post-op after a dignostic laparoscopy for endometriosis. S/p cardiology work-up with Dr. Albert and per patient it was normal  -graves disease, in remission per patient, not on medications

## 2022-04-26 NOTE — ED ADULT NURSE NOTE - OBJECTIVE STATEMENT
Presents in ED c/o palpitations and high blood pressure. Pt is 15 weeks pregnant. Was sent to ED by OBGYN for tachycardia and elevated BP assessment. Denies chest pain. States feels like chest is pounding.

## 2022-04-26 NOTE — OB PROVIDER H&P - NSHPLABSRESULTS_GEN_ALL_CORE
4/26 10.03>12.8/38.9<378, 139/4.2/104/20/11/0.6<94, AST/ALT 12/10, Mg 2, UA neg, A pos  VBG: lactate 1.5, pH 7.38, pCO2 41, pO2 41    15w4d (official sono): EFW 146gms, FHR 164bpm, post placenta, afv wnl  4/26 (in ED) FHR 123bpm      4/26 EKG: sinus tachy 142bpm  4/26 echo: EF 57%, normal LV function, mild AS

## 2022-04-26 NOTE — ED PROVIDER NOTE - NS ED ROS FT
Review of Systems:  CONSTITUTIONAL: + lightheadedness, No fever, No diaphoresis, No weight change  SKIN: No rash  HEMATOLOGIC: No abnormal bleeding or bruising  EYES: No eye pain, No blurred vision  ENT: No change in hearing, No sore throat, No neck pain, No rhinorrhea, No ear pain  RESPIRATORY: No shortness of breath, No cough  CARDIAC: No chest pain, + palpitations  GI: No abdominal pain, No nausea, No vomiting, No diarrhea, No constipation, No bright red blood per rectum or melena. No flank pain  : No dysuria, frequency, hematuria.   ENDO: No polydypsia, No polyuria, No heat/cold intolerance  MUSCULOSKELETAL: No joint pain, No swelling, No back pain  NEUROLOGIC: No numbness, No focal weakness, No headache, No dizziness  All other systems negative, unless specified in HPI

## 2022-04-26 NOTE — CONSULT NOTE ADULT - SUBJECTIVE AND OBJECTIVE BOX
[facility] Consult    HPI: 37yo  at 15w4d GA dated by LMP 2022 presented to the ED after she was seen at the outpatient [facility] office for routine ultrasound when she was noted to have abnormal vitals. In the office, she had a heart rate of 160bpm and a systolic blood pressure in the 180's.   Reports that when she was seen in the outpatient office she felt like her heart was racing. Denied dizziness, lightheadedness, weakness, chest pain, or shortness of breath.   Denies abdominal pain, vaginal bleeding, or leakage of fluid.    Pregnancy complicated by:  -cHTN. Currently on Procardia 30XL daily, last dose at 0930. Reports her blood pressures are normally 130's/80's. Reports she has had blood pressure issues during all of her pregnancies and was normally on methyl-dopa.   -tachycardia. Reports h/o tachycardia after one epidural and post-op after a dignostic laparoscopy for endometriosis. S/p cardiology work-up with Dr. Albert and per patient it was normal  -graves disease, in remission per patient, not on medications      S/p EKG in the emergency department that demonstrated sinus tachycardia with a rate of 140bpm. S/p ECHO that demonstrated an EF of 57% with mild aortic stenosis.    Ob/Gyn History:     2 FT , largest 6-12; both pregnancies complicated by cHTN on methyldopa   1 SAB no D&C     LMP - 2022                  Cycle Length - monthly  H/o endometriosis. Denies history of ovarian cysts, uterine fibroids, abnormal paps, or STIs  Last Pap Smear - , normal per patient    PAST MEDICAL & SURGICAL HISTORY:  Graves disease  remission since this current pregnancy; no medications for graves since 7weeks    Chronic hypertension  on aldomet 500 mg bid since 2018    History of appendectomy    H/O laparoscopy    History of tonsillectomy      Allergies: No Known Allergies    Intolerances: None      MEDICATIONS  (STANDING): None     FAMILY HISTORY: Denies relevant family history       Social history:  No alcohol use, drug use, or smoking.    Review of systems:  A complete review of systems was obtained and is negative except as described in the HPI.    Vitals  T(F): 97.9 (22 @ 15:36), Max: 98.2 (22 @ 12:41)  HR: 100 (22 @ 15:36) (99 - 160)  BP: 115/68 (22 @ 15:36) (115/68 - 134/88)  RR: 18 (22 @ 15:36) (18 - 18)  SpO2: 98% (22 @ 15:36) (98% - 99%)    Physical Exam:   GA: AOX3, NAD   HEENT:  Atraumatic.  Extraocular muscles intact.  CV:  Normal S1 S2.  No murmurs.  Pulmonary:  Clear to auscultation bilaterally.  No wheezing.  Abdomen:  Soft, nontender, nondistended, no rebound, no guarding.  Musculoskeletal:  No calf tenderness  Neurology:  Deep tendon reflexes 2+ bilaterally.  Psych:  Awake, alert, oriented to person, place, time.  VE (resident exam):  deferred, no obstetrical complaints    LABORATORY:                        12.8   10.03 )-----------( 378      ( 2022 13:04 )             38.9         139  |  104  |  11  ----------------------------<  94  4.2   |  20  |  0.6<L>    Ca    10.1      2022 13:04  Mg     2.0         TPro  7.6  /  Alb  4.5  /  TBili  <0.2  /  DBili  x   /  AST  12  /  ALT  10  /  AlkPhos  67        Urinalysis Basic - ( 2022 13:00 )    Color: Colorless / Appearance: Clear / S.005 / pH: x  Gluc: x / Ketone: Negative  / Bili: Negative / Urobili: <2 mg/dL   Blood: x / Protein: Negative / Nitrite: Negative   Leuk Esterase: Negative / RBC: x / WBC x   Sq Epi: x / Non Sq Epi: x / Bacteria: x        IMAGING:  < from: TTE Echo Complete w/o Contrast w/ Doppler (22 @ 15:45) >    ACC: [id number] EXAM:  ECHO TTE WO CON COMP W DOPP                          PROCEDURE DATE:  2022          INTERPRETATION:   [place] [address]                Phone: [phone].   TRANSTHORACIC ECHOCARDIOGRAM REPORT        Patient Name:   IKER GAMA Accession #: [id number]  Medical Rec #:  [id number]           Height:      64.0 in 162.6 cm  YOB: 1985           Weight:    200.0 lb 90.72 kg  Patient Age:    36 years            BSA:         1.96 m²  Patient Gender: F                   BP:          115/68 mmHg      Date of Exam:        2022 3:45:33 PM  Referring Physician: [id number] KRISTOFER DE LUNA  Sonographer:    Marija Lao  Reading Physician:   Francisco Schwarz MD.    Procedure:   2D Echo/Doppler/Color Doppler Complete.  Indications: R94.31 - Abnormal Electrocardiogram [ECG] [EKG]  Diagnosis:   Abnormal electrocardiogram [ECG] [EKG] - R94.31        Summary:   1. Normal global left ventricular systolic function.   2. LV Ejection Fraction by Mack's Method with a biplane EF of 57 %.   3. Normal left ventricular internal cavity size.   4. Normal left atrial size.   5. Normal right atrial size.   6. No evidence of mitral valve regurgitation.   7. LA volume Index is 27.4 ml/m² ml/m2.   8. Peak transaortic gradient equals 12.2 mmHg, mean transaortic gradient   equals 7.1 mmHg, the calculated aortic valve area equals 2.22 cm² by the   continuity equation consistent with mild aortic stenosis.    PHYSICIAN INTERPRETATION:  Left Ventricle: The left ventricular internal cavity size is normal. Left   ventricular wall thickness is normal. There is no left ventricular   hypertrophy. Global LV systolic function was normal.      LV Wall Scoring:  All segments are normal.    Right Ventricle: Normal right ventricular size and function. TV S' 0.2   m/s.  Left Atrium: Normal left atrial size. LA volume Index is 27.4 ml/m² ml/m2.  Right Atrium: Normal right atrial size.  Pericardium: There is no evidence of pericardial effusion.  Mitral Valve: Structurally normal mitral valve, with normal leaflet   excursion. No evidence of mitral valve regurgitation is seen.  Tricuspid Valve: Structurally normal tricuspid valve, with normal leaflet   excursion. Trivial tricuspid regurgitation is visualized.  Aortic Valve: Normal trileaflet aortic valve with normal opening. Peak   transaortic gradient equals 12.2 mmHg, mean transaortic gradient equals   7.1 mmHg, the calculated aortic valve area equals 2.22 cm² by the   continuity equation consistent with mild aortic stenosis. No evidence of   aortic valve regurgitation is seen.  Pulmonic Valve: Structurally normal pulmonic valve, with normal leaflet   excursion. No indication of pulmonic valve regurgitation.  Aorta: The aortic root and ascending aorta are structurally normal, with   no evidence of dilitation.  Pulmonary Artery: The main pulmonary artery is normal in size.  Venous: The inferior vena cava was normal sized, with respiratory size   variation greater than 50%.      2D AND M-MODE MEASUREMENTS (normal ranges within parentheses):  Left Ventricle:                  Normal   Right Ventricle:  IVSd (2D):              0.85 cm (0.7-1.1) TAPSE:     2.10 cm  LVPWd (2D):             0.82 cm (0.7-1.1)  LVIDd (2D):             3.67 cm (3.4-5.7)  LVIDs (2D):             2.43 cm  LV FS (2D):             33.9 %   (>25%)  Relative Wall Thickness  0.45    (<0.42)    SPECTRAL DOPPLER ANALYSIS:  LV DIASTOLIC FUNCTION:  MV Peak E: 0.98 m/s Decel Time: 163 msec  MV Peak A: 1.21 m/s  E/A Ratio: 0.81    Aortic Valve:  AoV VMax:    1.75 m/s  AoV Area, Vmax:    2.07 cm² Vmax Indx:    1.06   cm²/m²  AoV VTI:     0.31 m    AoV Area, VTI:     2.22 cm² VTIIndx:     1.13   cm²/m²  AoV Pk Grad: 12.2 mmHg AoV Area, Mn Grad: 2.09 cm² Mn Grad Indx: 1.07   cm²/m²  AoV Mn Grad: 7.1 mmHg    LVOT Vmax: 1.39 m/s  LVOT VTI:  0.27 m  LVOT Diam: 1.82 cm    Mitral Valve:  MV P1/2 Time: 47.35 msec  MV Area, PHT: 4.65 cm²    Tricuspid Valve and PA/RV Systolic Pressure: TR Max Velocity: 2.01 m/s RA   Pressure: 3 mmHg RVSP/PASP: 19.2 mmHg      [id number] Francisco Schwarz MD, Electronically signed on 2022 at   4:44:20 PM            *** Final ***    < end of copied text >  < from: 12 Lead ECG (22 @ 12:43) >    Ventricular Rate 142 BPM    Atrial Rate 142 BPM    P-R Interval 120 ms    QRS Duration 72 ms    Q-T Interval 354 ms    QTC Calculation(Bazett) 544 ms    P Axis 57 degrees    R Axis 90 degrees    T Axis 42 degrees    Diagnosis Line Sinus tachycardia  Possible Right ventricular hypertrophy  Nonspecific T wave abnormality  Abnormal ECG    Confirmed by FRANCISCO SCHWARZ MD ([id number]) on 2022 2:20:37 PM    < end of copied text >           [facility] Consult    HPI: 37yo  at 15w4d GA dated by LMP 2022 presented to the ED after she was seen at the outpatient [facility] office for routine ultrasound when she was noted to have abnormal vitals. In the office, she had a heart rate of 160bpm and a systolic blood pressure in the 180's.     Reports that when she was seen in the outpatient office she felt like her heart was racing. Denied dizziness, lightheadedness, weakness, chest pain, or shortness of breath.   Denies abdominal pain, vaginal bleeding, or leakage of fluid.    Pregnancy complicated by:  -cHTN. Currently on Procardia 30XL daily, last dose at 0930. Reports her blood pressures are normally 130's/80's. Reports she has had blood pressure issues during all of her pregnancies and was normally on methyl-dopa.   -tachycardia. Reports h/o tachycardia after one epidural and post-op after a diagnostic laparoscopy for endometriosis. S/p cardiology work-up with Dr. Albert and per patient it was normal  -graves disease, in remission per patient, not on medications      S/p EKG in the emergency department that demonstrated sinus tachycardia with a rate of 140bpm. S/p ECHO that demonstrated an EF of 57% with mild aortic stenosis.    Ob/Gyn History:     2 FT , largest 6-12; both pregnancies complicated by cHTN on methyldopa   1 SAB no D&C     LMP - 2022                  Cycle Length - monthly  H/o endometriosis. Denies history of ovarian cysts, uterine fibroids, abnormal paps, or STIs  Last Pap Smear - , normal per patient    PAST MEDICAL & SURGICAL HISTORY:  Graves disease  remission since this current pregnancy; no medications for graves since 7weeks    Chronic hypertension  previosuly on aldomet 500 mg bid since 2018    History of appendectomy    H/O laparoscopy    History of tonsillectomy      Allergies: No Known Allergies    Intolerances: None      MEDICATIONS  (STANDING): None     FAMILY HISTORY: Denies relevant family history       Social history:  No alcohol use, drug use, or smoking.    Review of systems:  A complete review of systems was obtained and is negative except as described in the HPI.    Vitals  T(F): 97.9 (22 @ 15:36), Max: 98.2 (22 @ 12:41)  HR: 100 (22 @ 15:36) (99 - 160)  BP: 115/68 (22 @ 15:36) (115/68 - 134/88)  RR: 18 (22 @ 15:36) (18 - 18)  SpO2: 98% (22 @ 15:36) (98% - 99%)    Physical Exam:   GA: AOX3, NAD   HEENT:  Atraumatic.  Extraocular muscles intact.  CV:  Normal S1 S2.  No murmurs.  Pulmonary:  Clear to auscultation bilaterally.  No wheezing.  Abdomen:  Soft, nontender, nondistended, no rebound, no guarding.  Musculoskeletal:  No calf tenderness  Neurology:  Deep tendon reflexes 2+ bilaterally.  Psych:  Awake, alert, oriented to person, place, time.  VE:  deferred, no obstetrical complaints    LABORATORY:                        12.8   10.03 )-----------( 378      ( 2022 13:04 )             38.9         139  |  104  |  11  ----------------------------<  94  4.2   |  20  |  0.6<L>    Ca    10.1      2022 13:04  Mg     2.0         TPro  7.6  /  Alb  4.5  /  TBili  <0.2  /  DBili  x   /  AST  12  /  ALT  10  /  AlkPhos  67        Urinalysis Basic - ( 2022 13:00 )    Color: Colorless / Appearance: Clear / S.005 / pH: x  Gluc: x / Ketone: Negative  / Bili: Negative / Urobili: <2 mg/dL   Blood: x / Protein: Negative / Nitrite: Negative   Leuk Esterase: Negative / RBC: x / WBC x   Sq Epi: x / Non Sq Epi: x / Bacteria: x        IMAGING:  < from: TTE Echo Complete w/o Contrast w/ Doppler (22 @ 15:45) >    ACC: [id number] EXAM:  ECHO TTE WO CON COMP W DOPP                          PROCEDURE DATE:  2022          INTERPRETATION:   [place] [address]                Phone: [phone].   TRANSTHORACIC ECHOCARDIOGRAM REPORT        Patient Name:   IKER GAMA Accession #: [id number]  Medical Rec #:  [id number]           Height:      64.0 in 162.6 cm  YOB: 1985           Weight:    200.0 lb 90.72 kg  Patient Age:    36 years            BSA:         1.96 m²  Patient Gender: F                   BP:          115/68 mmHg      Date of Exam:        2022 3:45:33 PM  Referring Physician: [id number] KRISTOFER DE LUNA  Sonographer:    Marija Lao  Reading Physician:   Francisco Schwarz MD.    Procedure:   2D Echo/Doppler/Color Doppler Complete.  Indications: R94.31 - Abnormal Electrocardiogram [ECG] [EKG]  Diagnosis:   Abnormal electrocardiogram [ECG] [EKG] - R94.31        Summary:   1. Normal global left ventricular systolic function.   2. LV Ejection Fraction by Mack's Method with a biplane EF of 57 %.   3. Normal left ventricular internal cavity size.   4. Normal left atrial size.   5. Normal right atrial size.   6. No evidence of mitral valve regurgitation.   7. LA volume Index is 27.4 ml/m² ml/m2.   8. Peak transaortic gradient equals 12.2 mmHg, mean transaortic gradient   equals 7.1 mmHg, the calculated aortic valve area equals 2.22 cm² by the   continuity equation consistent with mild aortic stenosis.    PHYSICIAN INTERPRETATION:  Left Ventricle: The left ventricular internal cavity size is normal. Left   ventricular wall thickness is normal. There is no left ventricular   hypertrophy. Global LV systolic function was normal.      LV Wall Scoring:  All segments are normal.    Right Ventricle: Normal right ventricular size and function. TV S' 0.2   m/s.  Left Atrium: Normal left atrial size. LA volume Index is 27.4 ml/m² ml/m2.  Right Atrium: Normal right atrial size.  Pericardium: There is no evidence of pericardial effusion.  Mitral Valve: Structurally normal mitral valve, with normal leaflet   excursion. No evidence of mitral valve regurgitation is seen.  Tricuspid Valve: Structurally normal tricuspid valve, with normal leaflet   excursion. Trivial tricuspid regurgitation is visualized.  Aortic Valve: Normal trileaflet aortic valve with normal opening. Peak   transaortic gradient equals 12.2 mmHg, mean transaortic gradient equals   7.1 mmHg, the calculated aortic valve area equals 2.22 cm² by the   continuity equation consistent with mild aortic stenosis. No evidence of   aortic valve regurgitation is seen.  Pulmonic Valve: Structurally normal pulmonic valve, with normal leaflet   excursion. No indication of pulmonic valve regurgitation.  Aorta: The aortic root and ascending aorta are structurally normal, with   no evidence of dilitation.  Pulmonary Artery: The main pulmonary artery is normal in size.  Venous: The inferior vena cava was normal sized, with respiratory size   variation greater than 50%.      2D AND M-MODE MEASUREMENTS (normal ranges within parentheses):  Left Ventricle:                  Normal   Right Ventricle:  IVSd (2D):              0.85 cm (0.7-1.1) TAPSE:     2.10 cm  LVPWd (2D):             0.82 cm (0.7-1.1)  LVIDd (2D):             3.67 cm (3.4-5.7)  LVIDs (2D):             2.43 cm  LV FS (2D):             33.9 %   (>25%)  Relative Wall Thickness  0.45    (<0.42)    SPECTRAL DOPPLER ANALYSIS:  LV DIASTOLIC FUNCTION:  MV Peak E: 0.98 m/s Decel Time: 163 msec  MV Peak A: 1.21 m/s  E/A Ratio: 0.81    Aortic Valve:  AoV VMax:    1.75 m/s  AoV Area, Vmax:    2.07 cm² Vmax Indx:    1.06   cm²/m²  AoV VTI:     0.31 m    AoV Area, VTI:     2.22 cm² VTIIndx:     1.13   cm²/m²  AoV Pk Grad: 12.2 mmHg AoV Area, Mn Grad: 2.09 cm² Mn Grad Indx: 1.07   cm²/m²  AoV Mn Grad: 7.1 mmHg    LVOT Vmax: 1.39 m/s  LVOT VTI:  0.27 m  LVOT Diam: 1.82 cm    Mitral Valve:  MV P1/2 Time: 47.35 msec  MV Area, PHT: 4.65 cm²    Tricuspid Valve and PA/RV Systolic Pressure: TR Max Velocity: 2.01 m/s RA   Pressure: 3 mmHg RVSP/PASP: 19.2 mmHg      [id number] Francisco Schwarz MD, Electronically signed on 2022 at   4:44:20 PM            *** Final ***    < end of copied text >  < from: 12 Lead ECG (22 @ 12:43) >    Ventricular Rate 142 BPM    Atrial Rate 142 BPM    P-R Interval 120 ms    QRS Duration 72 ms    Q-T Interval 354 ms    QTC Calculation(Bazett) 544 ms    P Axis 57 degrees    R Axis 90 degrees    T Axis 42 degrees    Diagnosis Line Sinus tachycardia  Possible Right ventricular hypertrophy  Nonspecific T wave abnormality  Abnormal ECG    Confirmed by FRANCISCO SCHWARZ MD ([id number]) on 2022 2:20:37 PM    < end of copied text >

## 2022-04-26 NOTE — DISCHARGE NOTE PROVIDER - NSDCMRMEDTOKEN_GEN_ALL_CORE_FT
labetalol 100 mg oral tablet: 1 tab(s) orally 2 times a day  ondansetron 4 mg oral tablet, disintegratin tab(s) orally 3 times a day   Prenatal Multivitamins with Folic Acid 1 mg oral tablet: 1 tab(s) orally once a day

## 2022-04-26 NOTE — OB PROVIDER H&P - NS_OBGYNHISTORY_OBGYN_ALL_OB_FT
2 FT , largest 6-12; both pregnancies complicated by cHTN on methyldopa   1 SAB no D&C     LMP - 2022                  Cycle Length - monthly  H/o endometriosis. Denies history of ovarian cysts, uterine fibroids, abnormal paps, or STIs  Last Pap Smear - , normal per patient

## 2022-04-26 NOTE — OB PROVIDER H&P - NSHPPHYSICALEXAM_GEN_ALL_CORE
Vital Signs Last 24 Hrs  T(F): 97.9 (26 Apr 2022 15:36), Max: 98.2 (26 Apr 2022 12:41)  HR: 100 (26 Apr 2022 15:36) (99 - 160)  BP: 115/68 (26 Apr 2022 15:36) (115/68 - 134/88)  RR: 18 (26 Apr 2022 15:36) (18 - 18)  SpO2: 98% (26 Apr 2022 15:36) (98% - 99%)    General Appearance - AAOx3, NAD  Heart - S1S2 regular rate and rhythm  Lung - CTA Bilaterally  Abdomen - Soft, nontender, nondistended, no rebound, no rigidity, no guarding, bowel sounds present, gravid, no palpable contractions, no fundal tenderness     GYN/Pelvis: Deferred, no obstetrical complaints

## 2022-04-26 NOTE — CHART NOTE - NSCHARTNOTEFT_GEN_A_CORE
PGY 2 note    Spoke to Dr. Savage who confirmed that patient was seen and evaluated in august of 2021 for thyroid toxicosis. State patient likely with Graves disease. Extensive cardiac workup was benign, echo at the time showed EF of 60% no, AS at that time. Dr. Savage's stated she was cleared for discharge, his office to schedule appointment to follow up in 2-3 days.     Dr. Smith and Dr. Garcia aware PGY 2 note    Spoke to Dr. Iglesias who confirmed that patient was seen and evaluated in august of 2021 for thyroid toxicosis. State patient likely with Graves disease. Extensive cardiac workup was benign, echo at the time showed EF of 60% no, AS at that time. Dr. Iglesias stated she was cleared for discharge, his office to schedule appointment to follow up in 2-3 days.     Dr. Smith and Dr. Garcia aware

## 2022-04-26 NOTE — ED PROVIDER NOTE - OBJECTIVE STATEMENT
Pt is a 35 y/o female,  16 weeks pregnant, with PMH of gestational HTN (recently started on nifedipine) presenting for palpitations. Went to her high risk OB Dr. Marx today and was found to be hypertensive with SBP in 180s and tachycardic 160s. Reports some lightheadedness but denies chest pain, SOB. Reports nausea and vomiting during pregnancy and has not been eating/drinking much lately. Follows with OB Dr. Smith.

## 2022-04-26 NOTE — ED PROVIDER NOTE - ATTENDING CONTRIBUTION TO CARE
36-year-old G3, P2, now presents from Dr. Office for evaluation of palpitations and tachycardia.  Patient denies any complaints.  Difficulty tolerating food.  Since week 12.  Last vomiting yesterday.  No abdominal pain.  No chest pain.  No shortness of breath.  No cough.    vs, tachycardia, no tachypnea, no respiratory distress, nontoxic, well appearing, pink conj, anicteric, MMM, neck supple, CTAB, RRR, equal radial pulses bilat, abd soft/nt/nd, no cva tend. no calves tend, no edema, no fnd. no rashes.

## 2022-04-26 NOTE — OB PROVIDER H&P - NSICDXPASTMEDICALHX_GEN_ALL_CORE_FT
Physical Therapy  Visit Type: treatment  Precautions:  Medical precautions:  fall risk; standard precautions.  Patient was admitted with a c/c of abdominal pain, nausea, vomitting and constipation.  Dx.: Acute UTI and hyponatremia.    11/21 s/p exploratory laparotomy, right hemicolectomy  Lines:     Basic: IV  Hearing: no hearing deficits  Vision:     Current vision: no visual deficits  Safety Measures: chair alarm    SUBJECTIVE  Patient agreed to participate in therapy this date.  \" I just don't feel that well today\"  Prior Living Situation  Information Provided By:: patient  Type of Home: Apartment ([place])  # Steps to Enter: 0  # Steps in the Home: 0  Lives With: Alone  Bathroom Equipment: Shower chair,Grab bars in shower (has a cane if needed)  Laundry on Main Level: Yes  Home Equipment: Cane  Additional Comments: states she does not use any assistive device to walk    Prior Communication/Cognition  Prior Cognition: Intact    Prior Function  Bed Mobility: Independent  Transfers: Independent  Ambulation in the Home: Independent  Ambulation in the Community: Independent  Transfer Equipment: None  Locomotion Equipment: None  History of Falls in past year: No  Prior Homemaking/IADLs: Needs assistance  Hearing: No hearing deficits  Patient / Family Goal: maximize function and return home    Pain     Location: abdominal cramping      OBJECTIVE       Affect/Behavior: alert, cooperative and sleepy    Transfers:    Assistive devices: gait belt and 2-wheeled walker    Sit to stand: contact guard/touching/steadying assist    Stand to sit: contact guard/touching/steadying assist    Toilet: contact guard/touching/steadying assist  Training completed:      Education details: patient safety, body mechanics and patient requires additional training    Pt able to perform bathroom hygiene with SBA  Gait/Ambulation:     Assistance: contact guard/touching/steadying assist   Assistive device: gait belt and 2-wheeled walker     Distance (ft): 200  Training Completed:      Education details: body mechanics, patient safety and patient requires additional training    Dypsnea noted with ambulation, relieved with sitting rest.  Pt had ambulated in hallway x 2 with nursing this am.       Interventions     Supine    Lower Extremity: Bilateral: ankle pumps, AROM,   Training provided: balance retraining, bed mobility training, transfer training, HEP training, safety training, body mechanics, activity tolerance and gait training    Skilled input: Verbal instruction/cues and tactile instruction/cues  Verbal Consent: Writer verbally educated and received verbal consent for hand placement, positioning of patient, and techniques to be performed today from patient        ASSESSMENT    Impairments: balance deficits, strength and range of motion  Functional Limitations: all functional mobility  Pt appears very fatigued this session.    Reports not feeling well in general today, c/o abdominal cramping, had bowel movement, RN notified.  Pt CGA with transfers, ambulating in hallway with CGA.   Pt lives in [facility], requiring CGA with mobility at this time.   Discharge Recommendations   Recommendation for Discharge: PT IL: Patient is appropriate for daily Physical Therapy        PT/OT Mobility Equipment for Discharge: RW   PT/OT ADL Equipment for Discharge: Owns shower chair, GBs  PT Identified Barriers to Discharge: generalized weakness and not feeling well prior to admission   Therapy Diagnosis:  Other Abnormalities of Gait and Mobility  Skilled therapy is required to address these limitations in attempt to maximize the patient's independence.  Progress: progressing toward goals    End of Session:   Safety measures: alarm system in place/re-engaged, call light within reach, equipment intact and lines intact  Handoff to: nurse    PLAN    Suggestions for next session as indicated: PT Frequency: 4 days/week  Frequency Comments: 11/23, home kieran MARIO, P2-1    A  minimum of 8 minutes per session x 1 week.  Interventions: balance, bed mobility, body mechanics, gait training, strengthening, ROM, safety education, functional transfer training, stairs retraining and HEP train/position  Agreement to plan and goals: patient agrees with goals and treatment plan        GOALS  Review Date: 11/26/2021  Long Term Goals: (to be met by time of discharge from hospital)  Sit to supine: Patient will complete sit to supine independent.  Status: progressing/ongoing  Supine to sit: Patient will complete supine to sit independent.  Status: progressing/ongoing  Stand (even surface): Patient will stand on even surface for dynamic standing balance, independent.   Sit to stand: Patient will complete sit to stand transfer with no device, independent.   Status: progressing/ongoing  Stand to sit: Patient will complete stand to sit transfer with no device, independent.   Status: progressing/ongoing  Ambulation (even): Patient will ambulate on even surface for 150 feet with 2-wheeled walker, modified independent.   Status: progressing/ongoing    Documented in the chart in the following areas: Assessment.      Therapy procedure time and total treatment time can be found documented on the Time Entry flowsheet   PAST MEDICAL HISTORY:  Chronic hypertension on aldomet 500 mg bid since 2018    Graves disease remission since this current pregnancy; no medications for graves since 7weeks

## 2022-04-26 NOTE — CONSULT NOTE ADULT - ASSESSMENT
A/P: A/P: 37yo  at 15w4d GA dated by LMP 2022, Rh pos, with chronic hypertension on ProCardia 30XL, with severely elevated blood pressure and tachycardia in the outpatient clinic; now with mild aortic stenosis on ultrasound   -recommend cardiology consult   -f/u phos, BNP, TSH with free T4  -diet: regular   -activity: ambulate as tolerated  -recommend gentle fluid hydration with 1L of LR   -recommend formal cardiology consult with Dr. Ricardo Hurtado   -will change ProCardia 30XL to labetalol 100mg BID   -vitals q4 hours  -if cardiology unable to evaluate patient in the emergency department, recommend admission to telemetry   -[facility] to follow if stays in house    Dr. Mcnamara aware A/P: A/P: 35yo  at 15w4d GA dated by LMP 2022, Rh pos, with chronic hypertension on ProCardia 30XL, with severely elevated blood pressure and tachycardia in the outpatient clinic;   -recommend cardiology consult   -f/u phos, BNP, TSH with free T4  -recommend gentle fluid hydration with 1L of LR   -agree to change ProCardia to Labetalol  -vitals q4 hours  -consider surveillance in telemetry   -Ob/ MFM to follow    Dr. Mcnamara aware

## 2022-04-26 NOTE — OB PROVIDER H&P - ASSESSMENT
37 yo  @15w4d by LMP 22, w/ cHTN on procardia 30xl, with elevated BPs      35yo  at 15w4d GA dated by LMP 2022, Rh pos, with chronic hypertension on ProCardia 30XL, with severely elevated blood pressure and tachycardia in the outpatient clinic   -recommend CBC, CMP, Mg, phos, BNP, TSH with free T4  -recommend official ECHO   -recommend EKG   -recommend gentle fluid hydration with 1L of LR   -recommend cardiology consult   -M consult  -will consider changing antihypertensive medications based upon cardiology recommendations       Dr. Smith aware 37 yo  @15w4d by LMP 22, w/ cHTN on procardia 30xl, with elevated BPs and tachycardia, admitted for prolonged monitoring, currently clinically and hemodynamically stable.    - cardio consult (pt to be seen by her cardiologist Dr. Savage in AM)  - switch procardia to labetalol 100mg BID  - MFM consult  - regular diet, PO hydration  - monitor BPs  - f/u T4, TSH, Ucx, BNP, troponin, COVID  - admitted overnight to telemetry for monitoring    Dr. Smith aware.  37 yo  @15w4d by LMP 22, h/o graves disease not on meds, w/ cHTN on procardia 30xl, with elevated BPs and tachycardia, admitted for prolonged monitoring, currently clinically and hemodynamically stable.    - cardio consult (pt to be seen by her cardiologist Dr. Savage in AM)  - switch procardia to labetalol 100mg BID  - MFM consult  - regular diet, PO hydration  - monitor BPs  - f/u T4, TSH, Ucx, BNP, troponin, COVID  - admitted overnight to telemetry for monitoring    Dr. Smith aware.

## 2022-04-26 NOTE — CHART NOTE - NSCHARTNOTEFT_GEN_A_CORE
I am on call for the [facility], received the call for the assessment of the patient, initially by reviewing her chart in the office, and her tests today in the hospital I spoke to ED physician and suggested to d/c the patient home, we call her tomorrow to be f/b Dr Hurtado in 2-3 days in the office, I was asked by ER to write a note on the chart to D/C her, but OB/Gyn note clearly stated if patient has not been seen by cardiology in ER then admit the patient, so I decided to let the patient stay over night be monitored and then tomorrow morning be seen by dr Hurtado in the ED and then be discharged.  Last visit in the office was */2021 supposed to have the 3 months f/u, but even > 8 months after had not had any follow up.  keep her over night, will be seen at AM then will decide on her medicine, and follow up plan.

## 2022-04-26 NOTE — ED PROVIDER NOTE - PROGRESS NOTE DETAILS
Carey: Pt receiving fluids. Repeat HR from has improved from 140s to 110 Carey: 36y F  16 weeks pregnant hx of gestational HTN presenting for palpitations since taking her Nifedipine this AM. Pt says she was at her high risk gyn's office ([facility]) and was found to be hypertensive and tachycardic. Pt endorses n/v throughout this pregnancy. No abdominal pain. No SOB/chest pain. No vaginal bleeding/discharge/urinary symptoms. OB is Heltzer. Pt denying f/c. Cardiac w/u, fluids. Bedside FHR, echo nml. OB/gyn c/s, authored by Dr. Edwards:.  Pending labs seen by GYN Aurelio: OB says MFM will come to evaluate the pt in the ER Aurelio: OB says MFM will come to evaluate the pt in the ER. Ob also requesting official echo and cards c/s. Authored by Dr. Edwards: echo done. nl fnx. seen by card fellow. pending dispo. s/o to dr macias - f/u recommendations and dispo ML: received sign out from Dr. Guzmán   pt pending cardio and ob recommendations PS: Spoke to OB. Pt to be admitted under their service to telemetry

## 2022-04-26 NOTE — CONSULT NOTE ADULT - ATTENDING COMMENTS
37 y/o  at 15w4d with PMH of CHTN (on Procardia 30mg XL) and Graves disease, (not on medications) was transferred by EMS due to tachycardia and severe range BP’s.     Patient felt palpitations, but no other associated symptoms. She has had 2 prior episodes of palpitations and tachycardia.     On arrival to ED tachycardia was in 160’s and decreased to low 100 with IVF. At the time of evaluation, the HR was 100-110. BP upon arrival to ED was in the mild range.     ECG with sinus tachycardia. Cardiology consulted. Official echo pending. TSH pending. Continue observation/ telemetry. Ob/ MFM will follow.

## 2022-04-26 NOTE — DISCUSSION/SUMMARY
[FreeTextEntry1] : Patient here for evaluation of fetal long bones.\par Severely hypertensive with irregular tachycardia 160 - 180\par She flushed and having palpitations.\par EMS called and patient sent to ED.\par Dr. Smith notified.

## 2022-04-26 NOTE — ED PROVIDER NOTE - PHYSICAL EXAMINATION
Telephone Encounter by Ella Funez at 08/30/17 08:59 AM     Author:  Ella Funez Service:  (none) Author Type:  Patient      Filed:  08/30/17 09:06 AM Encounter Date:  8/30/2017 Status:  Signed     :  Ella Funez (Patient )              HARRIETT REYNA    Patient Age: 22 year old    ACCT STATUS:   MESSAGE:[SH1.1T]   Mom is requesting a work in appointment with  tomorrow for a complete physical due to bumped appointment that was scheduled for today 8.30.  Mom is requesting a call back with response.[SH1.1M]  Routed to provider's clinical pool.     Next and Last Visit with Provider and Department  Next visit with NICKO NICHOLS is on No match found  Next visit with [facility] is on No match found  Last visit with NICKO NICHOLS. was on 11/28/2016 at 11:15 AM in INTERNAL MEDICINE SEQ  Last visit with INTERNAL MEDICINE was on 11/28/2016 at 11:15 AM in INTERNAL MEDICINE SEQ     WEIGHT AND HEIGHT: As of 08/28/2017 weight is 115 lbs.(52.164 kg). Height is 5' 3\"(1.600 m).   BMI is 20.38 kg/(m^2) calculated from:     Height 5' 3\" (1.6 m) as of 8/28/17     Weight 115 lb (52.164 kg) as of 8/28/17[SH1.1T]      Allergies     Allergen  Reactions   • Bactrim [Sulfamethoxazole W/Trimethoprim] Rash[SH1.2T]     Current outpatient prescriptions       Medication  Sig Dispense Refill   • norethindrone-ethinyl estradiol-iron (MICROGESTIN FE 1.5/30) 1.5-30 MG-MCG tablet Take 1 Tab by mouth daily. taking active pills only 84 Tab 5   • clindamycin-tretinoin (ZIANA) gel APPLY TO AFFECTED AREAS AT BEDTIME, USE SMALL AMOUNT PT NEEDS APPOINTMENT FOR FURTHER REFILLS 60 g 5   • Adapalene-Benzoyl Peroxide (EPIDUO) 0.1-2.5 % gel Apply to affected areas on the forehead and back once at bedtime 45 g 5   • fluticasone (FLONASE) 50 MCG/ACT nasal spray Use 1 Spray in each nostril daily. 16 g 12   • MULTIVITAMIN OR None Entered        PHARMACY to use:[SH1.1T]  na[SH1.1M]          Pharmacy preference(s) on file:    [id number], [place] - [address]  [facility] [address]  [facility] [address] [facility] [address]  [facility] [address] [facility]    CALL BACK INFO:[SH1.1T] Ok to leave response (including medical information) on answering machine[SH1.1M]  ROUTING:[SH1.1T] Patient's physician/staff[SH1.1M]        PCP: Zohreh Mccullough MD         INS: Payor: [employer] / Plan: *No Plan* / Product Type: *No Product type* / Note: This is the primary coverage, but no account was found for this location or the patient's primary location.   ADDRESS:  [address] [place] [id number][SH1.1T]       Revision History        User Key Date/Time User Provider Type Action    > SH1.2 08/30/17 09:06 AM Ella Funez Patient  Sign     SH1.1 08/30/17 08:59 AM Ella Funez Patient      M - Manual, T - Template             CONST: well appearing for age  HEAD:  normocephalic, atraumatic  EYES:  PERRLA, conjunctivae without injection, drainage or discharge  ENMT: nasal mucosa moist; mouth moist without ulcerations or lesions; throat moist without erythema, exudate, ulcerations or lesions  NECK:  supple  CARDIAC:  tachycardic  RESP:  respiratory rate and effort appear normal for age; lungs are clear to auscultation bilaterally; no rales or wheezes  ABDOMEN:  soft, nontender  MUSCULOSKELETAL/NEURO:  CN II-XII grossly intact, sensation intact throughout, 5/5 strength in all extremities, normal gait, normal movement, normal tone  SKIN:  normal skin color for age and race, well-perfused; warm and dry

## 2022-04-26 NOTE — DISCHARGE NOTE PROVIDER - CARE PROVIDER_API CALL
Jeremy Smith)  Obstetrics and Gynecology  [address] [place] [address]  Phone: [phone]  Fax: [phone]  Follow Up Time:     Ricardo Zuniga  CARDIOVASCULAR DISEASE  [address] [place] [address]  Phone: [phone]  Fax: [phone]  Follow Up Time: 1-3 days

## 2022-04-26 NOTE — DISCHARGE NOTE PROVIDER - HOSPITAL COURSE
HPI:   35yo  at 15w4d GA dated by LMP 2022 presented to the ED after she was seen at the outpatient [facility] office for routine ultrasound when she was noted to have abnormal vitals. In the office, she had a heart rate of 160bpm and a systolic blood pressure in the 180's.   Reports that when she was seen in the outpatient office she felt like her heart was racing. Denied dizziness, lightheadedness, weakness, chest pain, or shortness of breath.   Denies abdominal pain, vaginal bleeding, or leakage of fluid.    Pregnancy complicated by:  -cHTN. Currently on Procardia 30XL daily, last dose at 0930. Reports her blood pressures are normally 130's/80's. Reports she has had blood pressure issues during all of her pregnancies and was normally on methyl-dopa.   -tachycardia. Reports h/o tachycardia after one epidural and post-op after a dignostic laparoscopy for endometriosis. S/p cardiology work-up with Dr. Albert and per patient it was normal  -graves disease, in remission per patient, not on medications (2022 18:22)

## 2022-04-27 LAB
T3 SERPL-MCNC: 215 NG/DL — HIGH (ref 80–200)
T4 AB SER-ACNC: 11.5 UG/DL — SIGNIFICANT CHANGE UP (ref 4.6–12)
TSH SERPL-MCNC: 1.86 UIU/ML — SIGNIFICANT CHANGE UP (ref 0.27–4.2)

## 2022-04-28 LAB
CULTURE RESULTS: SIGNIFICANT CHANGE UP
SPECIMEN SOURCE: SIGNIFICANT CHANGE UP

## 2022-05-02 DIAGNOSIS — R00.0 TACHYCARDIA, UNSPECIFIED: ICD-10-CM

## 2022-05-02 DIAGNOSIS — O26.892 OTHER SPECIFIED PREGNANCY RELATED CONDITIONS, SECOND TRIMESTER: ICD-10-CM

## 2022-05-02 DIAGNOSIS — Z3A.15 15 WEEKS GESTATION OF PREGNANCY: ICD-10-CM

## 2022-05-02 DIAGNOSIS — O10.912 UNSPECIFIED PRE-EXISTING HYPERTENSION COMPLICATING PREGNANCY, SECOND TRIMESTER: ICD-10-CM

## 2022-05-02 DIAGNOSIS — E05.00 THYROTOXICOSIS WITH DIFFUSE GOITER WITHOUT THYROTOXIC CRISIS OR STORM: ICD-10-CM

## 2022-05-02 DIAGNOSIS — O99.891 OTHER SPECIFIED DISEASES AND CONDITIONS COMPLICATING PREGNANCY: ICD-10-CM

## 2022-05-02 DIAGNOSIS — O99.282 ENDOCRINE, NUTRITIONAL AND METABOLIC DISEASES COMPLICATING PREGNANCY, SECOND TRIMESTER: ICD-10-CM

## 2022-05-24 ENCOUNTER — ASOB RESULT (OUTPATIENT)
Age: 37
End: 2022-05-24

## 2022-05-24 ENCOUNTER — APPOINTMENT (OUTPATIENT)
Dept: ANTEPARTUM | Facility: CLINIC | Age: 37
End: 2022-05-24
Payer: COMMERCIAL

## 2022-05-24 VITALS
DIASTOLIC BLOOD PRESSURE: 85 MMHG | BODY MASS INDEX: 36.02 KG/M2 | WEIGHT: 211 LBS | SYSTOLIC BLOOD PRESSURE: 130 MMHG | HEIGHT: 64 IN | HEART RATE: 96 BPM

## 2022-05-24 PROCEDURE — 76817 TRANSVAGINAL US OBSTETRIC: CPT

## 2022-05-24 PROCEDURE — 76811 OB US DETAILED SNGL FETUS: CPT

## 2022-06-15 ENCOUNTER — APPOINTMENT (OUTPATIENT)
Dept: ANTEPARTUM | Facility: CLINIC | Age: 37
End: 2022-06-15
Payer: COMMERCIAL

## 2022-06-15 ENCOUNTER — ASOB RESULT (OUTPATIENT)
Age: 37
End: 2022-06-15

## 2022-06-15 VITALS
HEART RATE: 99 BPM | SYSTOLIC BLOOD PRESSURE: 125 MMHG | WEIGHT: 215 LBS | DIASTOLIC BLOOD PRESSURE: 80 MMHG | BODY MASS INDEX: 36.7 KG/M2 | HEIGHT: 64 IN

## 2022-06-15 PROCEDURE — 76816 OB US FOLLOW-UP PER FETUS: CPT

## 2022-06-30 ENCOUNTER — ASOB RESULT (OUTPATIENT)
Age: 37
End: 2022-06-30

## 2022-06-30 ENCOUNTER — NON-APPOINTMENT (OUTPATIENT)
Age: 37
End: 2022-06-30

## 2022-06-30 ENCOUNTER — APPOINTMENT (OUTPATIENT)
Dept: ANTEPARTUM | Facility: CLINIC | Age: 37
End: 2022-06-30

## 2022-06-30 VITALS
SYSTOLIC BLOOD PRESSURE: 140 MMHG | DIASTOLIC BLOOD PRESSURE: 90 MMHG | WEIGHT: 219 LBS | HEIGHT: 64 IN | HEART RATE: 121 BPM | BODY MASS INDEX: 37.39 KG/M2

## 2022-06-30 VITALS — DIASTOLIC BLOOD PRESSURE: 85 MMHG | SYSTOLIC BLOOD PRESSURE: 138 MMHG

## 2022-06-30 PROCEDURE — 99213 OFFICE O/P EST LOW 20 MIN: CPT | Mod: 25

## 2022-06-30 PROCEDURE — 76816 OB US FOLLOW-UP PER FETUS: CPT

## 2022-06-30 RX ORDER — NIFEDIPINE 30 MG/1
30 TABLET, EXTENDED RELEASE ORAL DAILY
Qty: 1 | Refills: 5 | Status: DISCONTINUED | COMMUNITY
Start: 2022-04-12 | End: 2022-06-30

## 2022-06-30 RX ORDER — LABETALOL HYDROCHLORIDE 100 MG/1
100 TABLET, FILM COATED ORAL EVERY 8 HOURS
Qty: 90 | Refills: 3 | Status: ACTIVE | COMMUNITY
Start: 2022-06-30

## 2022-06-30 NOTE — DISCUSSION/SUMMARY
[FreeTextEntry1] : 36 year old  4 para 2 0 1 2 LMP 22 WHIT 10/14/22 24 weeks and 6 days here for a growth scan asked to speak to me. \par History of hypertension currently on Labetalol 100 mg twice a day. Complains of palpitations.Heart rate 99 bpm. /90 repeated 135/85. Had a 24 hour heart monitor from her cardiologist last week. At home /90 heart rate 100 bpm.\par Increase labetalol to 100 mg three times a day.

## 2022-08-18 ENCOUNTER — APPOINTMENT (OUTPATIENT)
Dept: ANTEPARTUM | Facility: CLINIC | Age: 37
End: 2022-08-18

## 2022-08-18 ENCOUNTER — ASOB RESULT (OUTPATIENT)
Age: 37
End: 2022-08-18

## 2022-08-18 VITALS
SYSTOLIC BLOOD PRESSURE: 138 MMHG | DIASTOLIC BLOOD PRESSURE: 84 MMHG | HEIGHT: 64 IN | BODY MASS INDEX: 38.93 KG/M2 | HEART RATE: 100 BPM | WEIGHT: 228 LBS

## 2022-08-18 PROCEDURE — 76816 OB US FOLLOW-UP PER FETUS: CPT

## 2022-08-24 ENCOUNTER — OUTPATIENT (OUTPATIENT)
Dept: OUTPATIENT SERVICES | Facility: HOSPITAL | Age: 37
LOS: 1 days | Discharge: HOME | End: 2022-08-24

## 2022-08-24 VITALS — SYSTOLIC BLOOD PRESSURE: 109 MMHG | HEART RATE: 90 BPM | DIASTOLIC BLOOD PRESSURE: 58 MMHG

## 2022-08-24 VITALS
HEART RATE: 104 BPM | RESPIRATION RATE: 16 BRPM | SYSTOLIC BLOOD PRESSURE: 164 MMHG | DIASTOLIC BLOOD PRESSURE: 90 MMHG | TEMPERATURE: 98 F

## 2022-08-24 DIAGNOSIS — Z90.89 ACQUIRED ABSENCE OF OTHER ORGANS: Chronic | ICD-10-CM

## 2022-08-24 DIAGNOSIS — Z90.49 ACQUIRED ABSENCE OF OTHER SPECIFIED PARTS OF DIGESTIVE TRACT: Chronic | ICD-10-CM

## 2022-08-24 DIAGNOSIS — Z98.890 OTHER SPECIFIED POSTPROCEDURAL STATES: Chronic | ICD-10-CM

## 2022-08-24 LAB
ALBUMIN SERPL ELPH-MCNC: 3.7 G/DL — SIGNIFICANT CHANGE UP (ref 3.5–5.2)
ALP SERPL-CCNC: 98 U/L — SIGNIFICANT CHANGE UP (ref 30–115)
ALT FLD-CCNC: 11 U/L — SIGNIFICANT CHANGE UP (ref 0–41)
AMPHET UR-MCNC: NEGATIVE — SIGNIFICANT CHANGE UP
ANION GAP SERPL CALC-SCNC: 10 MMOL/L — SIGNIFICANT CHANGE UP (ref 7–14)
APPEARANCE UR: CLEAR — SIGNIFICANT CHANGE UP
APTT BLD: 25.3 SEC — LOW (ref 27–39.2)
AST SERPL-CCNC: 17 U/L — SIGNIFICANT CHANGE UP (ref 0–41)
BACTERIA # UR AUTO: ABNORMAL
BARBITURATES UR SCN-MCNC: NEGATIVE — SIGNIFICANT CHANGE UP
BASOPHILS # BLD AUTO: 0.02 K/UL — SIGNIFICANT CHANGE UP (ref 0–0.2)
BASOPHILS NFR BLD AUTO: 0.2 % — SIGNIFICANT CHANGE UP (ref 0–1)
BENZODIAZ UR-MCNC: NEGATIVE — SIGNIFICANT CHANGE UP
BILIRUB SERPL-MCNC: 0.2 MG/DL — SIGNIFICANT CHANGE UP (ref 0.2–1.2)
BILIRUB UR-MCNC: NEGATIVE — SIGNIFICANT CHANGE UP
BLD GP AB SCN SERPL QL: SIGNIFICANT CHANGE UP
BUN SERPL-MCNC: 10 MG/DL — SIGNIFICANT CHANGE UP (ref 10–20)
BUPRENORPHINE SCREEN, URINE RESULT: NEGATIVE — SIGNIFICANT CHANGE UP
CALCIUM SERPL-MCNC: 9.3 MG/DL — SIGNIFICANT CHANGE UP (ref 8.5–10.1)
CHLORIDE SERPL-SCNC: 99 MMOL/L — SIGNIFICANT CHANGE UP (ref 98–110)
CO2 SERPL-SCNC: 21 MMOL/L — SIGNIFICANT CHANGE UP (ref 17–32)
COCAINE METAB.OTHER UR-MCNC: NEGATIVE — SIGNIFICANT CHANGE UP
COLOR SPEC: SIGNIFICANT CHANGE UP
CREAT ?TM UR-MCNC: 36 MG/DL — SIGNIFICANT CHANGE UP
CREAT SERPL-MCNC: 0.5 MG/DL — LOW (ref 0.7–1.5)
DIFF PNL FLD: NEGATIVE — SIGNIFICANT CHANGE UP
EGFR: 125 ML/MIN/1.73M2 — SIGNIFICANT CHANGE UP
EOSINOPHIL # BLD AUTO: 0.04 K/UL — SIGNIFICANT CHANGE UP (ref 0–0.7)
EOSINOPHIL NFR BLD AUTO: 0.5 % — SIGNIFICANT CHANGE UP (ref 0–8)
EPI CELLS # UR: 4 /HPF — SIGNIFICANT CHANGE UP (ref 0–5)
FENTANYL UR QL: NEGATIVE — SIGNIFICANT CHANGE UP
FIBRINOGEN PPP-MCNC: >700 MG/DL — HIGH (ref 204.4–570.6)
GLUCOSE SERPL-MCNC: 70 MG/DL — SIGNIFICANT CHANGE UP (ref 70–99)
GLUCOSE UR QL: NEGATIVE — SIGNIFICANT CHANGE UP
HCT VFR BLD CALC: 32.6 % — LOW (ref 37–47)
HGB BLD-MCNC: 10.5 G/DL — LOW (ref 12–16)
HIV 1 & 2 AB SERPL IA.RAPID: SIGNIFICANT CHANGE UP
HYALINE CASTS # UR AUTO: 0 /LPF — SIGNIFICANT CHANGE UP (ref 0–7)
IMM GRANULOCYTES NFR BLD AUTO: 0.3 % — SIGNIFICANT CHANGE UP (ref 0.1–0.3)
INR BLD: 1.07 RATIO — SIGNIFICANT CHANGE UP (ref 0.65–1.3)
KETONES UR-MCNC: NEGATIVE — SIGNIFICANT CHANGE UP
L&D DRUG SCREEN, URINE: SIGNIFICANT CHANGE UP
LDH SERPL L TO P-CCNC: 192 — SIGNIFICANT CHANGE UP (ref 50–242)
LEUKOCYTE ESTERASE UR-ACNC: ABNORMAL
LYMPHOCYTES # BLD AUTO: 1.5 K/UL — SIGNIFICANT CHANGE UP (ref 1.2–3.4)
LYMPHOCYTES # BLD AUTO: 17.1 % — LOW (ref 20.5–51.1)
MCHC RBC-ENTMCNC: 26.4 PG — LOW (ref 27–31)
MCHC RBC-ENTMCNC: 32.2 G/DL — SIGNIFICANT CHANGE UP (ref 32–37)
MCV RBC AUTO: 81.9 FL — SIGNIFICANT CHANGE UP (ref 81–99)
METHADONE UR-MCNC: NEGATIVE — SIGNIFICANT CHANGE UP
MONOCYTES # BLD AUTO: 0.59 K/UL — SIGNIFICANT CHANGE UP (ref 0.1–0.6)
MONOCYTES NFR BLD AUTO: 6.7 % — SIGNIFICANT CHANGE UP (ref 1.7–9.3)
NEUTROPHILS # BLD AUTO: 6.59 K/UL — HIGH (ref 1.4–6.5)
NEUTROPHILS NFR BLD AUTO: 75.2 % — SIGNIFICANT CHANGE UP (ref 42.2–75.2)
NITRITE UR-MCNC: NEGATIVE — SIGNIFICANT CHANGE UP
NRBC # BLD: 0 /100 WBCS — SIGNIFICANT CHANGE UP (ref 0–0)
OPIATES UR-MCNC: NEGATIVE — SIGNIFICANT CHANGE UP
OXYCODONE UR-MCNC: NEGATIVE — SIGNIFICANT CHANGE UP
PCP UR-MCNC: NEGATIVE — SIGNIFICANT CHANGE UP
PH UR: 7 — SIGNIFICANT CHANGE UP (ref 5–8)
PLATELET # BLD AUTO: 324 K/UL — SIGNIFICANT CHANGE UP (ref 130–400)
POTASSIUM SERPL-MCNC: 4.3 MMOL/L — SIGNIFICANT CHANGE UP (ref 3.5–5)
POTASSIUM SERPL-SCNC: 4.3 MMOL/L — SIGNIFICANT CHANGE UP (ref 3.5–5)
PRENATAL SYPHILIS TEST: SIGNIFICANT CHANGE UP
PROPOXYPHENE QUALITATIVE URINE RESULT: NEGATIVE — SIGNIFICANT CHANGE UP
PROT ?TM UR-MCNC: <5 MG/DLG/24H — SIGNIFICANT CHANGE UP
PROT SERPL-MCNC: 6.9 G/DL — SIGNIFICANT CHANGE UP (ref 6–8)
PROT UR-MCNC: NEGATIVE — SIGNIFICANT CHANGE UP
PROT/CREAT UR-RTO: <0.1 RATIO — SIGNIFICANT CHANGE UP (ref 0–0.2)
PROTHROM AB SERPL-ACNC: 12.3 SEC — SIGNIFICANT CHANGE UP (ref 9.95–12.87)
RBC # BLD: 3.98 M/UL — LOW (ref 4.2–5.4)
RBC # FLD: 13.5 % — SIGNIFICANT CHANGE UP (ref 11.5–14.5)
RBC CASTS # UR COMP ASSIST: 2 /HPF — SIGNIFICANT CHANGE UP (ref 0–4)
SODIUM SERPL-SCNC: 130 MMOL/L — LOW (ref 135–146)
SP GR SPEC: 1.01 — SIGNIFICANT CHANGE UP (ref 1.01–1.03)
URATE SERPL-MCNC: 3.5 MG/DL — SIGNIFICANT CHANGE UP (ref 2.5–7)
UROBILINOGEN FLD QL: SIGNIFICANT CHANGE UP
WBC # BLD: 8.77 K/UL — SIGNIFICANT CHANGE UP (ref 4.8–10.8)
WBC # FLD AUTO: 8.77 K/UL — SIGNIFICANT CHANGE UP (ref 4.8–10.8)
WBC UR QL: 2 /HPF — SIGNIFICANT CHANGE UP (ref 0–5)

## 2022-08-24 RX ORDER — LABETALOL HCL 100 MG
200 TABLET ORAL ONCE
Refills: 0 | Status: COMPLETED | OUTPATIENT
Start: 2022-08-24 | End: 2022-08-24

## 2022-08-24 RX ORDER — ACETAMINOPHEN 500 MG
1000 TABLET ORAL ONCE
Refills: 0 | Status: COMPLETED | OUTPATIENT
Start: 2022-08-24 | End: 2022-08-24

## 2022-08-24 RX ORDER — SODIUM CHLORIDE 9 MG/ML
1000 INJECTION, SOLUTION INTRAVENOUS
Refills: 0 | Status: DISCONTINUED | OUTPATIENT
Start: 2022-08-24 | End: 2022-09-08

## 2022-08-24 RX ADMIN — Medication 200 MILLIGRAM(S): at 13:05

## 2022-08-24 RX ADMIN — SODIUM CHLORIDE 125 MILLILITER(S): 9 INJECTION, SOLUTION INTRAVENOUS at 14:50

## 2022-08-24 RX ADMIN — SODIUM CHLORIDE 125 MILLILITER(S): 9 INJECTION, SOLUTION INTRAVENOUS at 15:58

## 2022-08-24 RX ADMIN — Medication 1000 MILLIGRAM(S): at 16:12

## 2022-08-24 RX ADMIN — Medication 400 MILLIGRAM(S): at 15:34

## 2022-08-24 NOTE — OB PROVIDER TRIAGE NOTE - NSHPPHYSICALEXAM_GEN_ALL_CORE
Vital Signs Last 24 Hrs  T(C): 36.8 (24 Aug 2022 12:39), Max: 36.8 (24 Aug 2022 12:39)  T(F): 98.2 (24 Aug 2022 12:39), Max: 98.2 (24 Aug 2022 12:39)  HR: 99 (24 Aug 2022 13:42) (93 - 104)  BP: 128/70 (24 Aug 2022 13:42) (128/70 - 164/90)  RR: 16 (24 Aug 2022 12:39) (16 - 16)  Parameters below as of 24 Aug 2022 12:39  Patient On (Oxygen Delivery Method): room air    Gen: NAD, sitting comfortably  Cardio: RRR, no m/r/r  Resp: CTAB, no w/r/r  Abd: Gravid, soft, NT, moderately palpable ctx, no suprapubic tenderness, no CVA tenderness  Ext: no LE swelling bilaterally, 2+ DTRs  SVE: 1/0/-3, vtx, intact  EFM: 145/mod/+accels  [place]: q2-5m  Sono: cephalic, fundal placenta, BPP 8/8, MVP 5.31cm, EFW 1976g (31.8%), CL 3.38cm

## 2022-08-24 NOTE — OB PROVIDER TRIAGE NOTE - NSOBPROVIDERNOTE_OBGYN_ALL_OB_FT
35yo  @32w5d, GBS unknown, with chronic HTN on labetalol, r/o superimposed preeclampsia, with contractions r/o  labor, BPP 10/10, fetal wellbeing reassuring.    - home labetalol dose 200mg PO given now, will f/u BPs  - no magnesium or IV push medication at this time, repeat BP after PO labetalol wnl  - IV fluids  - re-examine in 2-3 hours  - f/u preeclamptic labs, vaginitis, GBS, UA, ucx, urprcr    Dr. Smith aware. 37yo  @32w5d, GBS unknown, with chronic HTN on labetalol, r/o superimposed preeclampsia, with contractions r/o  labor, BPP 10/10, fetal wellbeing reassuring.    - home labetalol dose 200mg PO given now, will f/u BPs  - no magnesium or IV push medication at this time, repeat BP after PO labetalol wnl  - IV fluids  - re-examine in 2-3 hours  - f/u preeclamptic labs, vaginitis, GBS, UA, ucx, urprcr    Dr. Smith aware.    ADDENDUM: preeclamptic labs wnl, patient reports contractions have not worsened, serial exams unchanged  -discharge to home with f/u for shceduled prenatal appointment with Dr. Smith  -preeclamptic precautions, labor precautions    Dr. Smith and Dr. Welsh aware

## 2022-08-24 NOTE — OB PROVIDER TRIAGE NOTE - ADDITIONAL INSTRUCTIONS
If you have contractions every few minutes, vaginal bleeding, leakage of fluid or you don't feel the baby move please call your doctor or come to the emergency department. If you do not feel the baby move, lay down and count the number of times you feel any movement. You should count 10 kicks over the course of 2 hours, if you do not count 10 prior to reaching the 2 hour time, call your doctor or return immediately to labor and delivery.    If you notice a headache that won't go away, changes in vision, chest pain, shortness of breath, abdominal pain, severe leg swelling or pain please call your provider or go to the hospital.  If your blood pressure is 160/110 or higher, call your doctor or go to the hospital.

## 2022-08-24 NOTE — CHART NOTE - NSCHARTNOTEFT_GEN_A_CORE
PGY 4     Patient evaluated at bedside.  Continues to report contractions that are stronger and increasing in frequency.  Denies vaginal bleeding or leakage of fluid.  Also reports frontal headache.  No changes in vision, SOB, epigastric tenderness, LE swelling/pain.    Vital Signs Last 24 Hrs  T(C): 36.8 (24 Aug 2022 12:39), Max: 36.8 (24 Aug 2022 12:39)  T(F): 98.2 (24 Aug 2022 12:39), Max: 98.2 (24 Aug 2022 12:39)  HR: 93 (24 Aug 2022 14:57) (88 - 104)  BP: 117/69 (24 Aug 2022 14:57) (112/69 - 164/90)  RR: 16 (24 Aug 2022 12:39) (16 - 16)    Parameters below as of 24 Aug 2022 12:39  Patient On (Oxygen Delivery Method): room air        EFM: 145/mod/+accel  [place]oco: q5m  SVE: 1/0/-3 @1510    Plan:  - due to continuing contraction pain and headache, will cont to monitor at this time  - f/u urprcr  - IV tylenol for pain  - IVF    Dr. Smith to be made aware.

## 2022-08-24 NOTE — OB PROVIDER TRIAGE NOTE - HISTORY OF PRESENT ILLNESS
37yo  @32w5d with WHIT 10/14 by LMP  consistent with first trimester sonogram presenting to L&D for contractions since this AM @0930.  Reports contractions are q5-10 minutes apart and feel like strong period cramps.  Denies vaginal bleeding or leakage of fluid.  Good FM.  Pregnancy complicated by chronic hypertension, she is currently on labetalol 200mg BID.  She did not take a dose this AM.  Also has a history of maternal tachycardia up to 160s in her first trimester she is s/p normal cardiology work-up.  She reports being induced for cHTN in her previous pregnancies, denies history of IV mag. She has not had to take BPs medications between pregnancies. BPs at home usually in the 150s/100s after labetalol dose.  Also has a history of Graves' Disease, currently not on medication.  Denies any other complications with this pregnancy.  Last intercourse was last week, last PO intake 0800 this AM, last Bm this morning. GBS unknown.    On presentation, initial BP was 164/90 @1239 followed by 163/94 @1305.  Denies headache, changes in vision, chest pain, SOB, RUQ/epigastric pain, N/V, fevers, chills, diarrhea, LE pain/swelling.  She did not take her home labetalol this AM so that was given to her at 1305.  Repeat BP @1342 was 128/70.

## 2022-08-24 NOTE — OB PROVIDER TRIAGE NOTE - NSICDXPASTMEDICALHX_GEN_ALL_CORE_FT
PAST MEDICAL HISTORY:  Chronic hypertension on labetalol 200mg BID    Graves disease remission since this current pregnancy; no medications for graves since 7weeks

## 2022-08-24 NOTE — OB RN TRIAGE NOTE - FALL HARM RISK - UNIVERSAL INTERVENTIONS
As per patient lives alone, no stairs/alone
Bed in lowest position, wheels locked, appropriate side rails in place/Call bell, personal items and telephone in reach/Instruct patient to call for assistance before getting out of bed or chair/Non-slip footwear when patient is out of bed/[place] to call system/Physically safe environment - no spills, clutter or unnecessary equipment/Purposeful Proactive Rounding/Room/bathroom lighting operational, light cord in reach

## 2022-08-24 NOTE — OB PROVIDER TRIAGE NOTE - NS_OBGYNHISTORY_OBGYN_ALL_OB_FT
OBhx: G1 FT , 37 wks, IOL for gHTN, on methyldopa during pregnancy, 7lb  G2 FT , 39wks, IOL for cHTN, on methyldopa during pregnancy, 6-11  SAB x4, all approx 6wks GA, no D&C    GYNhx: H/o endometriosis s/p diagnostic lap and removal of endometriosis.  Denies fibroids, cysts, abnormal pap smears, STDs.

## 2022-08-24 NOTE — OB PROVIDER TRIAGE NOTE - NS_TRIAGEMEDICALSCREENINGPERFORMEDBY_OBGYN_ALL_OB_FT
FEMALE GONZALES;      GA  weeks;   FT  Age:5d;   PMA: _____      Current Status:  39 week readmit for hyperbilirubinemia    Weight: 3480 grams  ( ___ )     Intake(ml/kg/day):  74  Urine output:    x7                                  Stools (frequency):  x2  Other:     *******************************************************    FEN: Feed EHM/SA PO ad zahra q3 hours.   Respiratory: Comfortable in RA.  CV: No current issues. Continue cardiorespiratory monitoring.  Heme: Hyperbilirubinemia, requiring phototherapy. No off - recheck bili.     ID: No signs and symptoms of sepsis.   Neuro: Appropriate exam for GA. No signs of bilirubin encephalopathy. Repeat hearing screen PTD.     Social:    Labs/Imaging/Studies: FEMALE GONZALES;      GA  weeks;   FT  Age:5d;   PMA: _____      Current Status:  39 week readmit for hyperbilirubinemia    Weight: 3480 grams  ( ___ )     Intake(ml/kg/day):  74  Urine output:    x7                                  Stools (frequency):  x2  Other:     *******************************************************    FEN: Feed EHM/SA PO ad zahra q3 hours.   Respiratory: Comfortable in RA.  CV: No current issues. Continue cardiorespiratory monitoring.  Heme: Hyperbilirubinemia, requiring phototherapy. No off - recheck bili.     ID: No signs and symptoms of sepsis.   Neuro: Appropriate exam for GA. No signs of bilirubin encephalopathy. Repeat hearing screen PTD.     Social:    Labs/Imaging/Studies:  bili at 12pm - if < 12, may d/c home fwith follow up by PMD FEMALE GONZALES;      GA  weeks;   FT  Age:5d;   PMA: _____      Current Status:  39 week readmit for hyperbilirubinemia    Weight: 3480 grams  ( __adm_ )     Intake(ml/kg/day):  74  Urine output:    x7                                  Stools (frequency):  x2  Other:     *******************************************************    FEN: Feed EHM/SA PO ad zahra q3 hours.   Respiratory: Comfortable in RA.  CV: No current issues. Continue cardiorespiratory monitoring.  Heme: Hyperbilirubinemia, requiring phototherapy. No off - recheck bili.     ID: No signs and symptoms of sepsis.   Neuro: Appropriate exam for GA. No signs of bilirubin encephalopathy.     Social:    Labs/Imaging/Studies:  bili at 12pm - if < 12, may d/c home fwith follow up by PMD Jose

## 2022-08-24 NOTE — OB PROVIDER TRIAGE NOTE - NSICDXPASTSURGICALHX_GEN_ALL_CORE_FT
PAST SURGICAL HISTORY:  H/O laparoscopy     History of appendectomy laparoscopic    History of tonsillectomy

## 2022-08-24 NOTE — CHART NOTE - NSCHARTNOTEFT_GEN_A_CORE
PGY 2 Note    Patient evaluated at bedside, reports continued contractions, about the same frequency and intensity. Denies vaginal bleeding or leakage of fluid.    T(C): 36.8 (24 Aug 2022 12:39), Max: 36.8 (24 Aug 2022 12:39)  T(F): 98.2 (24 Aug 2022 12:39), Max: 98.2 (24 Aug 2022 12:39)  HR: 90 (24 Aug 2022 16:59) (86 - 104)  BP: 121/60 (24 Aug 2022 16:59) (111/54 - 164/90)  RR: 16 (24 Aug 2022 12:39) (16 - 16)      EFM: 140BPM/mod hemant/accels pos  toco: q6min  SVE: 1/0/-3 @1705    Plan:  - EFM/TOCO  - f/u urprcr  - IV tylenol for pain  - IVF    Dr. Smith to be made aware. Dr. Callahan aware

## 2022-08-24 NOTE — OB PROVIDER TRIAGE NOTE - NSHPLABSRESULTS_GEN_ALL_CORE
Labs:  3/15  HBsAg NR  HCV NR  rubella immune  measles immune  varicella immune  RPR NR  A pos, antibody neg  HIV NR    Sono:  5/24: 19w4d, EFW 338g, vtx, post placenta, MVP 5.6cm, normal anatomy  6/30: 24w6d, EFW 801g (63%)  8/18: 31w6d, EFW 1917g (49%), post placenta, vtx

## 2022-08-25 DIAGNOSIS — O26.893 OTHER SPECIFIED PREGNANCY RELATED CONDITIONS, THIRD TRIMESTER: ICD-10-CM

## 2022-08-25 DIAGNOSIS — O47.03 FALSE LABOR BEFORE 37 COMPLETED WEEKS OF GESTATION, THIRD TRIMESTER: ICD-10-CM

## 2022-08-26 ENCOUNTER — EMERGENCY (EMERGENCY)
Facility: HOSPITAL | Age: 37
LOS: 0 days | Discharge: HOME | End: 2022-08-26
Attending: EMERGENCY MEDICINE | Admitting: EMERGENCY MEDICINE

## 2022-08-26 VITALS
RESPIRATION RATE: 20 BRPM | HEIGHT: 64 IN | OXYGEN SATURATION: 98 % | DIASTOLIC BLOOD PRESSURE: 72 MMHG | SYSTOLIC BLOOD PRESSURE: 136 MMHG | TEMPERATURE: 98 F | HEART RATE: 98 BPM

## 2022-08-26 DIAGNOSIS — Z90.89 ACQUIRED ABSENCE OF OTHER ORGANS: Chronic | ICD-10-CM

## 2022-08-26 DIAGNOSIS — K64.9 UNSPECIFIED HEMORRHOIDS: ICD-10-CM

## 2022-08-26 DIAGNOSIS — Z88.6 ALLERGY STATUS TO ANALGESIC AGENT: ICD-10-CM

## 2022-08-26 DIAGNOSIS — Z98.890 OTHER SPECIFIED POSTPROCEDURAL STATES: Chronic | ICD-10-CM

## 2022-08-26 DIAGNOSIS — Z88.8 ALLERGY STATUS TO OTHER DRUGS, MEDICAMENTS AND BIOLOGICAL SUBSTANCES STATUS: ICD-10-CM

## 2022-08-26 DIAGNOSIS — Z90.49 ACQUIRED ABSENCE OF OTHER SPECIFIED PARTS OF DIGESTIVE TRACT: Chronic | ICD-10-CM

## 2022-08-26 PROBLEM — I10 ESSENTIAL (PRIMARY) HYPERTENSION: Chronic | Status: ACTIVE | Noted: 2019-05-17

## 2022-08-26 LAB
CULTURE RESULTS: SIGNIFICANT CHANGE UP
GROUP B BETA STREP DNA (PCR): SIGNIFICANT CHANGE UP
GROUP B BETA STREP INTERPRETATION: SIGNIFICANT CHANGE UP
SOURCE GROUP B STREP: SIGNIFICANT CHANGE UP
SPECIMEN SOURCE: SIGNIFICANT CHANGE UP

## 2022-08-26 PROCEDURE — 99283 EMERGENCY DEPT VISIT LOW MDM: CPT

## 2022-08-26 RX ORDER — LIDOCAINE 4 G/100G
1 CREAM TOPICAL ONCE
Refills: 0 | Status: COMPLETED | OUTPATIENT
Start: 2022-08-26 | End: 2022-08-26

## 2022-08-26 RX ORDER — LIDOCAINE 4 G/100G
1 CREAM TOPICAL
Qty: 20 | Refills: 0
Start: 2022-08-26 | End: 2022-09-01

## 2022-08-26 RX ADMIN — LIDOCAINE 1 APPLICATION(S): 4 CREAM TOPICAL at 12:35

## 2022-08-26 NOTE — ED PROVIDER NOTE - OBJECTIVE STATEMENT
35yo F 33wks pregnant presenting for evaluation of hemorrhoids. States she has been applying preparation H, witchhazel, is having soft stools, non straining. No rectal bleeding. Was also recently evaluated in l&d for irregular contractions 2d ago. States that since then contractions have improved. Has been taking tylenol for hemorrhoid pain with minimal relief. No vaginal bleeding, LOF, change in fetal movement.

## 2022-08-26 NOTE — ED PROVIDER NOTE - PATIENT PORTAL LINK FT
You can access the FollowMyHealth Patient Portal offered by [facility] by registering at the following website: [email or web address]. By joining [employer]’s FollowMyHealth portal, you will also be able to view your health information using other applications (apps) compatible with our system.

## 2022-08-26 NOTE — ED PROVIDER NOTE - CLINICAL SUMMARY MEDICAL DECISION MAKING FREE TEXT BOX
37yo F 33wks pregnant presenting for evaluation of hemorrhoids. States she has been applying preparation H, witchhazel, is having soft stools, non straining. No rectal bleeding. Was also recently evaluated in l&d for irregular contractions 2d ago. States that since then contractions have improved. Has been taking tylenol for hemorrhoid pain with minimal relief. No vaginal bleeding, LOF, change in fetal movement. dw OB resident Ila, as contractions are improved, no need for further eval from their standpoint acutely. Comfortable with discharge and follow-up outpatient, strict return precautions given. Endorses understanding of all of this and aware that they can return at any time for new or concerning symptoms. No further questions or concerns at this time

## 2022-08-26 NOTE — ED PROVIDER NOTE - CARE PROVIDER_API CALL
Marlon Dixon)  ColonRectal Surgery; Surgery  [address], [address] [place] [address]  Phone: [phone]  Fax: [phone]  Follow Up Time:

## 2022-08-26 NOTE — ED PROVIDER NOTE - NS ED ROS FT
Constitutional:  See HPI.   Eyes:  No visual changes, eye pain or discharge.  ENMT:  No hearing changes, pain, discharge or infections. No neck pain or stiffness.  Cardiac:  No chest pain, SOB or edema. No chest pain with exertion.  Respiratory:  No cough or respiratory distress. No hemoptysis.  GI:  No nausea, vomiting, diarrhea, abdominal pain.        Neuro:  No LOC. No headache or weakness.    Skin:  No skin rash.  Except as in HPI, all other review of systems is negative

## 2022-08-26 NOTE — ED PROVIDER NOTE - PHYSICAL EXAMINATION
Constitutional: Well appearing. No acute distress. Non toxic.   Eyes: PERRLA. Extraocular movements intact, no entrapment. Conjunctiva normal.      Neck: Supple, non tender, full range of motion.   Pulm:  Normal work of breathing.  Abd: soft gravid ND +BS.   Ext: Warm and well perfused x4, moving all extremities, no edema.   Rectal exam- 2 non thrombosed hemorrhoids, no fissures, no rash/lesions. no bleeding.   Psy: Cooperative, appropriate.   Skin: Warm, dry, no rash  Neuro: nonfocal

## 2022-08-26 NOTE — ED ADULT NURSE NOTE - NS_SISCREENINGSR_GEN_ALL_ED
Urinary frequency: absent, Urinary urgency: absent, Urinary incontinence: absent   MUSCULOSKELETAL Neck pain: absent, Back pain: absent, Stiffness: absent, Muscle pain: absent, Joint pain: absent,  Restless legs: absent   DERMATOLOGIC Hair loss: absent, Skin changes: absent   NEUROLOGIC Memory loss: absent, Confusion: absent, Seizures: absent Trouble walking or imbalance: absent, Dizziness: absent, Weakness: absent, Numbness: absent, Tremor: absent, Spasm: absent, Speech difficulty: absent, Headache: absent, Light sensitivity: absent   PSYCHIATRIC Anxiety: absent, Hallucination: absent, Mood disorder: absent   HEMATOLOGIC Abnormal bleeding: absent, Anemia: absent, Clotting disorder: absent, Lymph gland changes: absent                   Outpatient Prescriptions Marked as Taking for the 11/7/17 encounter (Office Visit) with Joo Jurado MD   Medication Sig Dispense Refill    buPROPion (WELLBUTRIN SR) 150 MG extended release tablet Take 1 tablet by mouth 2 times daily 60 tablet 11    warfarin (COUMADIN) 5 MG tablet TAKE 1 TO 2 TABLETS EVERY  tablet 3    atenolol (TENORMIN) 25 MG tablet TAKE 1 TABLET TWICE DAILY 180 tablet 3    omeprazole (PRILOSEC) 40 MG delayed release capsule TAKE 1 CAPSULE EVERY DAY 90 capsule 3    atorvastatin (LIPITOR) 40 MG tablet TAKE 1 TABLET EVERY DAY 90 tablet 3    citalopram (CELEXA) 40 MG tablet TAKE 1 TABLET EVERY DAY 90 tablet 3    etodolac (LODINE) 500 MG tablet Take 1 tablet by mouth 2 times daily 180 tablet 3    losartan (COZAAR) 100 MG tablet TAKE 1 TABLET BY MOUTH DAILY 90 tablet 3    donepezil (ARICEPT) 10 MG tablet TAKE 1 TABLET EVERY DAY 90 tablet 3    fluticasone (FLONASE) 50 MCG/ACT nasal spray USE 2 SPRAYS IN EACH NOSTRIL EVERY DAY 3 Bottle 3    SYMBICORT 160-4.5 MCG/ACT AERO Inhale 2 puffs into the lungs daily      B Complex Vitamins (VITAMIN B COMPLEX PO) Take 1 tablet by mouth daily PHYSICAL EXAMINATION                                              .                                                                                                     General Appearance:  Alert, cooperative, no signs of distress, appears stated age, obese, reasonably dressed and groomed    Head:  Normocephalic, no signs of trauma   Eyes:  Conjunctiva/corneas clear; eyelids intact   Ears:  Normal external ear and canals   Nose: Nares normal, mucosa normal, no drainage    Throat: Lips and tongue normal; teeth normal; gums normal   Neck: Supple neck with intact flexion, extension and rotation;   trachea midline; no adenopathy; thyroid: not enlarged; no carotid pulse abnormality   Back:   Symmetric, no curvature, ROM adequate   Lungs:   Respirations unlabored   Chest Wall:  No deformity but barrel chested    Heart:  Regular rate and rhythm   Abdomen:   No masses but protuberant   Extremities: Extremities normal but right ankle fused, no cyanosis, no edema   Pulses: Symmetric over head and neck   Skin: Skin color, texture normal, no rashes, no lesions   Lymph nodes: Cervical, supraclavicular nodes normal                                         NEUROLOGIC EXAMINATION    MENTAL STATUS: Alert, oriented, intact memory, no confusion, normal speech, normal language, no hallucination or delusion   CRANIAL NERVES: II     -      Visual fields intact to confrontation  III,IV,VI -  EOMs full, no afferent defect, no                      ANNALISA, no ptosis  V     -     Normal facial sensation  VII    -     Normal facial symmetry  VIII   -     Intact hearing with hearing aids  IX,X -     Symmetrical palate  XI    -     Symmetrical shoulder shrug  XII   -     Midline tongue, no atrophy    MOTOR FUNCTION: Normal bulk, normal tone, normal power;  no involuntary movements, no tremor   SENSORY FUNCTION: Normal touch, normal pin, normal vibration   CEREBELLAR FUNCTION: Intact fine motor control over upper limbs and good alternating movements    REFLEX FUNCTION: Symmetric, no perverted reflex   STATION and GAIT Normal station, normal gait even with fused ankle               ASSESSMENT and  PLAN:      In summary, your patient, Susan Rascon appears the same. There are no new lateralizing or localizing neurologic deficits or features to suggest a degenerative dementia like Alzheimer's disease. Obviously this was a concern of his because of several family members with the same problem. Complicating matters is the hypercoagulable syndrome and the need for Coumadin. This certainly puts him at risk of future ischemic cerebrovascular disease and there are some punctate white matter changes that already correlate with occult ischemic injury though there is insufficient evidence for a vascular dementia. Likewise there is no need for him to continue the Aricept therapy. There is no need for additional neurologic investigation but I have suggested he take up with you further management of his underlying depression. He states that his antidepressant medications seem to be helping but haven't provided him the \"get up and go\" that he really wants and needs. Possibly a psychiatrist can help. In the meantime, I will remain available for questions or future neurologic needs but I see no reason for him to return on a regular basis and suggested he continue in your good management. If you have questions, please do not hesitate to call me.        Sincerely,        Viridiana Wiggins MD Negative

## 2022-08-26 NOTE — ED ADULT NURSE NOTE - CHIEF COMPLAINT QUOTE
[FreeTextEntry1] : 74 year old male s/p total laryngectomy for laryngeal cancer.\par 1. post surgical hypothyroidism - TSH improved, euthyrod on exam\par -  cont LT4 112, mcg daily\par - repeat TFTs 1 week before next visit \par \par 2. post surgical hypoparathyroidism - eucalcemic\par - cont calcitriol 0.25 mcg BID\par - cont caltrate 600 mg TID\par - repeat levels 1 week before next visirt Patient 33 weeks pregnant with c/o hemorrhoid

## 2022-08-29 ENCOUNTER — EMERGENCY (EMERGENCY)
Facility: HOSPITAL | Age: 37
LOS: 0 days | Discharge: HOME | End: 2022-08-29
Attending: EMERGENCY MEDICINE | Admitting: EMERGENCY MEDICINE

## 2022-08-29 VITALS
RESPIRATION RATE: 18 BRPM | HEIGHT: 64 IN | OXYGEN SATURATION: 99 % | HEART RATE: 94 BPM | SYSTOLIC BLOOD PRESSURE: 145 MMHG | WEIGHT: 220.02 LBS | DIASTOLIC BLOOD PRESSURE: 88 MMHG | TEMPERATURE: 98 F

## 2022-08-29 DIAGNOSIS — Z90.89 ACQUIRED ABSENCE OF OTHER ORGANS: Chronic | ICD-10-CM

## 2022-08-29 DIAGNOSIS — O22.43 HEMORRHOIDS IN PREGNANCY, THIRD TRIMESTER: ICD-10-CM

## 2022-08-29 DIAGNOSIS — Z88.6 ALLERGY STATUS TO ANALGESIC AGENT: ICD-10-CM

## 2022-08-29 DIAGNOSIS — Z98.890 OTHER SPECIFIED POSTPROCEDURAL STATES: Chronic | ICD-10-CM

## 2022-08-29 DIAGNOSIS — K64.9 UNSPECIFIED HEMORRHOIDS: ICD-10-CM

## 2022-08-29 DIAGNOSIS — Z90.49 ACQUIRED ABSENCE OF OTHER SPECIFIED PARTS OF DIGESTIVE TRACT: Chronic | ICD-10-CM

## 2022-08-29 DIAGNOSIS — Z3A.33 33 WEEKS GESTATION OF PREGNANCY: ICD-10-CM

## 2022-08-29 DIAGNOSIS — Z88.8 ALLERGY STATUS TO OTHER DRUGS, MEDICAMENTS AND BIOLOGICAL SUBSTANCES: ICD-10-CM

## 2022-08-29 LAB
A VAGINAE DNA VAG QL NAA+PROBE: SIGNIFICANT CHANGE UP
BVAB2 DNA VAG QL NAA+PROBE: SIGNIFICANT CHANGE UP
C ALBICANS DNA VAG QL NAA+PROBE: NEGATIVE — SIGNIFICANT CHANGE UP
C GLABRATA DNA VAG QL NAA+PROBE: NEGATIVE — SIGNIFICANT CHANGE UP
C KRUSEI DNA VAG QL NAA+PROBE: NEGATIVE — SIGNIFICANT CHANGE UP
C LUSITANIAE DNA VAG QL NAA+PROBE: NEGATIVE — SIGNIFICANT CHANGE UP
C TRACH RRNA SPEC QL NAA+PROBE: NEGATIVE — SIGNIFICANT CHANGE UP
MEGA1 DNA VAG QL NAA+PROBE: SIGNIFICANT CHANGE UP
N GONORRHOEA RRNA SPEC QL NAA+PROBE: NEGATIVE — SIGNIFICANT CHANGE UP
T VAGINALIS RRNA SPEC QL NAA+PROBE: NEGATIVE — SIGNIFICANT CHANGE UP

## 2022-08-29 PROCEDURE — 99283 EMERGENCY DEPT VISIT LOW MDM: CPT

## 2022-08-29 RX ORDER — HYDROCORTISONE 1 %
1 OINTMENT (GRAM) TOPICAL
Qty: 1 | Refills: 0
Start: 2022-08-29 | End: 2022-09-11

## 2022-08-29 NOTE — ED PROVIDER NOTE - PATIENT PORTAL LINK FT
You can access the FollowMyHealth Patient Portal offered by [facility] by registering at the following website: [email or web address]. By joining [employer]’s FollowMyHealth portal, you will also be able to view your health information using other applications (apps) compatible with our system.

## 2022-08-29 NOTE — ED PROVIDER NOTE - OBJECTIVE STATEMENT
36-year-old female past medical history of hemorrhoids presenting for continued hemorrhoid pain.  Patient has not had relief with topical lidocaine and topical hydrocortisone sitz bath's and stool softeners.  Patient has been having bowel movements regularly denies abdominal pain

## 2022-08-29 NOTE — ED PROVIDER NOTE - NSICDXPASTMEDICALHX_GEN_ALL_CORE_FT
PAST MEDICAL HISTORY:  Chronic hypertension on labetalol 200mg BID    Graves disease remission since this current pregnancy; no medications for graves since 7weeks     initiation of breastfeeding/breast milk feeding

## 2022-08-29 NOTE — ED PROVIDER NOTE - PHYSICAL EXAMINATION
Physical Exam    Vital Signs: I have reviewed the initial vital signs.  Constitutional: well-nourished, appears stated age, no acute distress  Eyes: Conjunctiva pink, Sclera clear,   Cardiovascular: S1 and S2, regular rate, regular rhythm, well-perfused extremities, radial pulses equal and 2+, calves nonttp, equal in size  Respiratory: unlabored respiratory effort, speaking in full sentences, handling oral secretions, %clear to auscultation bilaterally no wheezing, rales and rhonchi  Gastrointestinal: soft, non-tender abdomen, no pulsatile mass, normal bowl sounds  AURELIO: + 2 large nonthrombosed, pink hemmorhoids, no fissures or active bleeding   Musculoskeletal: supple neck, no lower extremity edema, no midline tenderness, paraspinal tenderness, clavicular creptius, painful rom, moving all extreities appropriately, no gross bony deformities or swelling.  Integumentary: warm, dry, no rashes, lacerations,

## 2022-08-29 NOTE — ED PROVIDER NOTE - ATTENDING APP SHARED VISIT CONTRIBUTION OF CARE
36-year-old female currently 33 weeks pregnant presents to ED for evaluation of her hemorrhoids.  Patient was seen in the emergency department on August 26 for the same concern.  Patient's been using Preparation H, witch hazel, lidocaine topical and stool softeners but she still having pain.  Patient was told to come back to the emergency department to see if they were thrombosed and ready to be excised.  Patient noticed some minimal bleeding.  No abdominal pain no contractions no nausea no vomiting no abdominal pain.    Const: NAD  Eyes: PERRL, no conjunctival injection  HENT:  Neck supple without meningismus   CV: RRR, Warm, well-perfused extremities  RESP: CTA B/L, no tachypnea   GI: soft, non-tender, gravid  Rectal: exam done with JODIE Guy which demonstrates large tender external hemorrhoids which hare non-thrombosed, no bleeding   Psych: Appropriate mood and affect.

## 2022-08-29 NOTE — ED PROVIDER NOTE - CLINICAL SUMMARY MEDICAL DECISION MAKING FREE TEXT BOX
35 yo F presented to ED for external hemorrhoids which are not actively bleeding and non-thrombosed. Prescribed topical steroids and gave pt strict return precautions. DC Home

## 2022-08-29 NOTE — ED PROVIDER NOTE - NSFOLLOWUPINSTRUCTIONS_ED_ALL_ED_FT
YOU HAVE BEEN PRESCRIBED 2.5% TOPICAL HYDROCORTISONE OINTMENT. USE THIS INSTEAD OF PREPARATION H WHICH HAS 1% HYDROCORTISONE. CONTINUE TO USE TOPICAL LIDOCAINE, PERFORM SITS BATHS AND TAKE TYLENOL.    MAKE AN APPOINTMENT WITH THE COLORECTAL DOCTOR.

## 2022-08-29 NOTE — ED ADULT NURSE NOTE - NSIMPLEMENTINTERV_GEN_ALL_ED
Implemented All Universal Safety Interventions:  [place] to call system. Call bell, personal items and telephone within reach. Instruct patient to call for assistance. Room bathroom lighting operational. Non-slip footwear when patient is off stretcher. Physically safe environment: no spills, clutter or unnecessary equipment. Stretcher in lowest position, wheels locked, appropriate side rails in place. lumbar spine

## 2022-08-29 NOTE — ED PROVIDER NOTE - NS ED ATTENDING STATEMENT MOD
This was a shared visit with the DAYNA. I reviewed and verified the documentation and independently performed the documented:

## 2022-08-30 ENCOUNTER — APPOINTMENT (OUTPATIENT)
Dept: SURGERY | Facility: CLINIC | Age: 37
End: 2022-08-30

## 2022-08-30 ENCOUNTER — NON-APPOINTMENT (OUTPATIENT)
Age: 37
End: 2022-08-30

## 2022-08-30 VITALS
HEIGHT: 64 IN | OXYGEN SATURATION: 98 % | HEART RATE: 115 BPM | DIASTOLIC BLOOD PRESSURE: 77 MMHG | TEMPERATURE: 97 F | WEIGHT: 220 LBS | BODY MASS INDEX: 37.56 KG/M2 | SYSTOLIC BLOOD PRESSURE: 113 MMHG

## 2022-08-30 PROCEDURE — 99203 OFFICE O/P NEW LOW 30 MIN: CPT

## 2022-09-01 RX ORDER — ONDANSETRON 4 MG/1
4 TABLET, ORALLY DISINTEGRATING ORAL
Qty: 9 | Refills: 0 | Status: ACTIVE | COMMUNITY
Start: 2022-03-09

## 2022-09-01 NOTE — ASSESSMENT
[FreeTextEntry1] : 36F with a thrombosed external hemorrhoid\par \par I had a discussion with the patient regarding their thrombosed external hemorrhoid. Given her pregnancy, their symptoms are improving and there are no complications I recommended conservative management. I recommended a high fiber diet, a fiber supplement, miralax as needed, hydrocortisone cream and sitz baths. We will see the patient back after she delivers.\par

## 2022-09-01 NOTE — REASON FOR VISIT
Goal Outcome Evaluation:              Outcome Evaluation: VSS in RA, no events so far this shift. PO feeding fair with preemie nipple, has taken 35/42/35ml so far this shift, no emesis. Infant has been very tired this shift, had multiple desats to 70's/80's during PO feedings but only needed resting periods to recover. Voiding/stooling. Circ still bleeding but decreasing with each diaper change. HC taken this shift. No parental contact thus far, should be back at 9am. Infant has pictures scheduled for today at 10:30am.   [Initial Evaluation] : an initial evaluation

## 2022-09-01 NOTE — HISTORY OF PRESENT ILLNESS
[FreeTextEntry1] : Patient is a 36F with PMH of HTN, currently pregnant in 3rd trimester who presents for evaluation of hemorrhoids.  She has had pain and swelling for 2 weeks.  She has daily BM. Patient denies fevers, chills, nausea, vomiting, abdominal pain, constipation, diarrhea, blood in the stool or unexpected weight loss.  Patient denies a family history of colon cancer rectal cancer or inflammatory bowel disease.  Patient has never had a colonoscopy.\par

## 2022-09-01 NOTE — PHYSICAL EXAM
[Abdomen Masses] : No abdominal masses [Abdomen Tenderness] : ~T No ~M abdominal tenderness [No HSM] : no hepatosplenomegaly [None] : no anal fissures seen [Excoriation] : no perianal excoriation [Fistula] : no fistulas [Wart] : no warts [Ulcer ___ cm] : no ulcers [Pilonidal Cyst] : no pilonidal cysts [Tender, Swollen] : tender, swollen [Thrombosed] : that was thrombosed [Skin Tags] : there were no residual hemorrhoidal skin tags seen [Respiratory Effort] : normal respiratory effort [Normal Rate and Rhythm] : normal rate and rhythm [de-identified] : external exam shows a 3cm right sided thrombosed hemorrhoid [de-identified] : AURELIO and anoscopy not performed due to pain [de-identified] : awake, alert and in no acute distress

## 2022-09-16 ENCOUNTER — ASOB RESULT (OUTPATIENT)
Age: 37
End: 2022-09-16

## 2022-09-16 ENCOUNTER — APPOINTMENT (OUTPATIENT)
Dept: ANTEPARTUM | Facility: CLINIC | Age: 37
End: 2022-09-16

## 2022-09-16 VITALS
WEIGHT: 232 LBS | BODY MASS INDEX: 39.61 KG/M2 | HEIGHT: 64 IN | SYSTOLIC BLOOD PRESSURE: 160 MMHG | HEART RATE: 96 BPM | DIASTOLIC BLOOD PRESSURE: 100 MMHG

## 2022-09-16 VITALS — SYSTOLIC BLOOD PRESSURE: 140 MMHG | DIASTOLIC BLOOD PRESSURE: 90 MMHG | HEART RATE: 96 BPM

## 2022-09-16 PROCEDURE — 76818 FETAL BIOPHYS PROFILE W/NST: CPT | Mod: 59

## 2022-09-16 PROCEDURE — ZZZZZ: CPT

## 2022-09-16 PROCEDURE — 76816 OB US FOLLOW-UP PER FETUS: CPT

## 2022-09-19 LAB
ALBUMIN SERPL ELPH-MCNC: 3.6 G/DL
ALP BLD-CCNC: 107 U/L
ALT SERPL-CCNC: 8 U/L
ANION GAP SERPL CALC-SCNC: 13 MMOL/L
AST SERPL-CCNC: 13 U/L
BASOPHILS # BLD AUTO: 0.02 K/UL
BASOPHILS NFR BLD AUTO: 0.2 %
BILIRUB SERPL-MCNC: <0.2 MG/DL
BUN SERPL-MCNC: 15 MG/DL
CALCIUM SERPL-MCNC: 9.7 MG/DL
CHLORIDE SERPL-SCNC: 101 MMOL/L
CO2 SERPL-SCNC: 21 MMOL/L
CREAT SERPL-MCNC: 0.7 MG/DL
EGFR: 116 ML/MIN/1.73M2
EOSINOPHIL # BLD AUTO: 0.05 K/UL
EOSINOPHIL NFR BLD AUTO: 0.6 %
GLUCOSE SERPL-MCNC: 93 MG/DL
HCT VFR BLD CALC: 32.3 %
HGB BLD-MCNC: 9.7 G/DL
IMM GRANULOCYTES NFR BLD AUTO: 0.4 %
LYMPHOCYTES # BLD AUTO: 1.63 K/UL
LYMPHOCYTES NFR BLD AUTO: 18.2 %
MAN DIFF?: NORMAL
MCHC RBC-ENTMCNC: 25.6 PG
MCHC RBC-ENTMCNC: 30 G/DL
MCV RBC AUTO: 85.2 FL
MONOCYTES # BLD AUTO: 0.74 K/UL
MONOCYTES NFR BLD AUTO: 8.3 %
NEUTROPHILS # BLD AUTO: 6.47 K/UL
NEUTROPHILS NFR BLD AUTO: 72.3 %
PLATELET # BLD AUTO: 303 K/UL
POTASSIUM SERPL-SCNC: 4.6 MMOL/L
PROT SERPL-MCNC: 6.4 G/DL
RBC # BLD: 3.79 M/UL
RBC # FLD: 14.5 %
SODIUM SERPL-SCNC: 135 MMOL/L
URATE SERPL-MCNC: 3.8 MG/DL
WBC # FLD AUTO: 8.95 K/UL

## 2022-09-21 ENCOUNTER — APPOINTMENT (OUTPATIENT)
Dept: ANTEPARTUM | Facility: CLINIC | Age: 37
End: 2022-09-21

## 2022-09-21 ENCOUNTER — ASOB RESULT (OUTPATIENT)
Age: 37
End: 2022-09-21

## 2022-09-21 VITALS
WEIGHT: 235 LBS | HEART RATE: 98 BPM | BODY MASS INDEX: 40.12 KG/M2 | SYSTOLIC BLOOD PRESSURE: 160 MMHG | DIASTOLIC BLOOD PRESSURE: 95 MMHG | HEIGHT: 64 IN

## 2022-09-21 DIAGNOSIS — O09.299 SUPERVISION OF PREGNANCY WITH OTHER POOR REPRODUCTIVE OR OBSTETRIC HISTORY, UNSPECIFIED TRIMESTER: ICD-10-CM

## 2022-09-21 DIAGNOSIS — O09.529 SUPERVISION OF ELDERLY MULTIGRAVIDA, UNSPECIFIED TRIMESTER: ICD-10-CM

## 2022-09-21 DIAGNOSIS — K64.5 PERIANAL VENOUS THROMBOSIS: ICD-10-CM

## 2022-09-21 DIAGNOSIS — O10.019 PRE-EXISTING ESSENTIAL HYPERTENSION COMPLICATING PREGNANCY, UNSPECIFIED TRIMESTER: ICD-10-CM

## 2022-09-21 DIAGNOSIS — O99.013 ANEMIA COMPLICATING PREGNANCY, THIRD TRIMESTER: ICD-10-CM

## 2022-09-21 PROCEDURE — ZZZZZ: CPT

## 2022-09-21 PROCEDURE — 76818 FETAL BIOPHYS PROFILE W/NST: CPT

## 2022-09-23 ENCOUNTER — INPATIENT (INPATIENT)
Facility: HOSPITAL | Age: 37
LOS: 1 days | Discharge: HOME | End: 2022-09-25
Attending: OBSTETRICS & GYNECOLOGY | Admitting: OBSTETRICS & GYNECOLOGY

## 2022-09-23 VITALS — TEMPERATURE: 98 F | HEART RATE: 107 BPM | RESPIRATION RATE: 16 BRPM

## 2022-09-23 DIAGNOSIS — Z90.89 ACQUIRED ABSENCE OF OTHER ORGANS: Chronic | ICD-10-CM

## 2022-09-23 DIAGNOSIS — Z98.890 OTHER SPECIFIED POSTPROCEDURAL STATES: Chronic | ICD-10-CM

## 2022-09-23 DIAGNOSIS — Z90.49 ACQUIRED ABSENCE OF OTHER SPECIFIED PARTS OF DIGESTIVE TRACT: Chronic | ICD-10-CM

## 2022-09-23 LAB
ALBUMIN SERPL ELPH-MCNC: 3.6 G/DL — SIGNIFICANT CHANGE UP (ref 3.5–5.2)
ALP SERPL-CCNC: 124 U/L — HIGH (ref 30–115)
ALT FLD-CCNC: 11 U/L — SIGNIFICANT CHANGE UP (ref 0–41)
AMPHET UR-MCNC: NEGATIVE — SIGNIFICANT CHANGE UP
ANION GAP SERPL CALC-SCNC: 13 MMOL/L — SIGNIFICANT CHANGE UP (ref 7–14)
APPEARANCE UR: CLEAR — SIGNIFICANT CHANGE UP
AST SERPL-CCNC: 16 U/L — SIGNIFICANT CHANGE UP (ref 0–41)
BACTERIA # UR AUTO: ABNORMAL
BARBITURATES UR SCN-MCNC: NEGATIVE — SIGNIFICANT CHANGE UP
BASOPHILS # BLD AUTO: 0.01 K/UL — SIGNIFICANT CHANGE UP (ref 0–0.2)
BASOPHILS NFR BLD AUTO: 0.1 % — SIGNIFICANT CHANGE UP (ref 0–1)
BENZODIAZ UR-MCNC: NEGATIVE — SIGNIFICANT CHANGE UP
BILIRUB SERPL-MCNC: <0.2 MG/DL — SIGNIFICANT CHANGE UP (ref 0.2–1.2)
BILIRUB UR-MCNC: NEGATIVE — SIGNIFICANT CHANGE UP
BLD GP AB SCN SERPL QL: SIGNIFICANT CHANGE UP
BUN SERPL-MCNC: 15 MG/DL — SIGNIFICANT CHANGE UP (ref 10–20)
BUPRENORPHINE SCREEN, URINE RESULT: NEGATIVE — SIGNIFICANT CHANGE UP
CALCIUM SERPL-MCNC: 9.2 MG/DL — SIGNIFICANT CHANGE UP (ref 8.4–10.5)
CHLORIDE SERPL-SCNC: 101 MMOL/L — SIGNIFICANT CHANGE UP (ref 98–110)
CO2 SERPL-SCNC: 20 MMOL/L — SIGNIFICANT CHANGE UP (ref 17–32)
COCAINE METAB.OTHER UR-MCNC: NEGATIVE — SIGNIFICANT CHANGE UP
COLOR SPEC: YELLOW — SIGNIFICANT CHANGE UP
CREAT ?TM UR-MCNC: 98 MG/DL — SIGNIFICANT CHANGE UP
CREAT SERPL-MCNC: 0.5 MG/DL — LOW (ref 0.7–1.5)
DIFF PNL FLD: ABNORMAL
EGFR: 125 ML/MIN/1.73M2 — SIGNIFICANT CHANGE UP
EOSINOPHIL # BLD AUTO: 0.1 K/UL — SIGNIFICANT CHANGE UP (ref 0–0.7)
EOSINOPHIL NFR BLD AUTO: 1.1 % — SIGNIFICANT CHANGE UP (ref 0–8)
EPI CELLS # UR: 3 /HPF — SIGNIFICANT CHANGE UP (ref 0–5)
FENTANYL UR QL: NEGATIVE — SIGNIFICANT CHANGE UP
GLUCOSE SERPL-MCNC: 73 MG/DL — SIGNIFICANT CHANGE UP (ref 70–99)
GLUCOSE UR QL: NEGATIVE — SIGNIFICANT CHANGE UP
HCT VFR BLD CALC: 32.1 % — LOW (ref 37–47)
HGB BLD-MCNC: 10.1 G/DL — LOW (ref 12–16)
HYALINE CASTS # UR AUTO: 1 /LPF — SIGNIFICANT CHANGE UP (ref 0–7)
IMM GRANULOCYTES NFR BLD AUTO: 1.3 % — HIGH (ref 0.1–0.3)
KETONES UR-MCNC: NEGATIVE — SIGNIFICANT CHANGE UP
L&D DRUG SCREEN, URINE: SIGNIFICANT CHANGE UP
LDH SERPL L TO P-CCNC: 196 — SIGNIFICANT CHANGE UP (ref 50–242)
LEUKOCYTE ESTERASE UR-ACNC: NEGATIVE — SIGNIFICANT CHANGE UP
LYMPHOCYTES # BLD AUTO: 1.22 K/UL — SIGNIFICANT CHANGE UP (ref 1.2–3.4)
LYMPHOCYTES # BLD AUTO: 13.6 % — LOW (ref 20.5–51.1)
MCHC RBC-ENTMCNC: 24.9 PG — LOW (ref 27–31)
MCHC RBC-ENTMCNC: 31.5 G/DL — LOW (ref 32–37)
MCV RBC AUTO: 79.3 FL — LOW (ref 81–99)
METHADONE UR-MCNC: NEGATIVE — SIGNIFICANT CHANGE UP
MONOCYTES # BLD AUTO: 0.7 K/UL — HIGH (ref 0.1–0.6)
MONOCYTES NFR BLD AUTO: 7.8 % — SIGNIFICANT CHANGE UP (ref 1.7–9.3)
NEUTROPHILS # BLD AUTO: 6.79 K/UL — HIGH (ref 1.4–6.5)
NEUTROPHILS NFR BLD AUTO: 76.1 % — HIGH (ref 42.2–75.2)
NITRITE UR-MCNC: NEGATIVE — SIGNIFICANT CHANGE UP
NRBC # BLD: 0 /100 WBCS — SIGNIFICANT CHANGE UP (ref 0–0)
OPIATES UR-MCNC: NEGATIVE — SIGNIFICANT CHANGE UP
OXYCODONE UR-MCNC: NEGATIVE — SIGNIFICANT CHANGE UP
PCP UR-MCNC: NEGATIVE — SIGNIFICANT CHANGE UP
PH UR: 6 — SIGNIFICANT CHANGE UP (ref 5–8)
PLATELET # BLD AUTO: 307 K/UL — SIGNIFICANT CHANGE UP (ref 130–400)
POTASSIUM SERPL-MCNC: 4.4 MMOL/L — SIGNIFICANT CHANGE UP (ref 3.5–5)
POTASSIUM SERPL-SCNC: 4.4 MMOL/L — SIGNIFICANT CHANGE UP (ref 3.5–5)
PRENATAL SYPHILIS TEST: SIGNIFICANT CHANGE UP
PROPOXYPHENE QUALITATIVE URINE RESULT: NEGATIVE — SIGNIFICANT CHANGE UP
PROT ?TM UR-MCNC: 9 MG/DLG/24H — SIGNIFICANT CHANGE UP
PROT SERPL-MCNC: 6.9 G/DL — SIGNIFICANT CHANGE UP (ref 6–8)
PROT UR-MCNC: SIGNIFICANT CHANGE UP
PROT/CREAT UR-RTO: 0.1 RATIO — SIGNIFICANT CHANGE UP (ref 0–0.2)
RBC # BLD: 4.05 M/UL — LOW (ref 4.2–5.4)
RBC # FLD: 14.8 % — HIGH (ref 11.5–14.5)
RBC CASTS # UR COMP ASSIST: 3 /HPF — SIGNIFICANT CHANGE UP (ref 0–4)
SARS-COV-2 RNA SPEC QL NAA+PROBE: SIGNIFICANT CHANGE UP
SODIUM SERPL-SCNC: 134 MMOL/L — LOW (ref 135–146)
SP GR SPEC: 1.02 — SIGNIFICANT CHANGE UP (ref 1.01–1.03)
URATE SERPL-MCNC: 4 MG/DL — SIGNIFICANT CHANGE UP (ref 2.5–7)
UROBILINOGEN FLD QL: SIGNIFICANT CHANGE UP
WBC # BLD: 8.94 K/UL — SIGNIFICANT CHANGE UP (ref 4.8–10.8)
WBC # FLD AUTO: 8.94 K/UL — SIGNIFICANT CHANGE UP (ref 4.8–10.8)
WBC UR QL: 4 /HPF — SIGNIFICANT CHANGE UP (ref 0–5)

## 2022-09-23 RX ORDER — OXYTOCIN 10 UNIT/ML
2 VIAL (ML) INJECTION
Qty: 30 | Refills: 0 | Status: DISCONTINUED | OUTPATIENT
Start: 2022-09-23 | End: 2022-09-24

## 2022-09-23 RX ORDER — SODIUM CHLORIDE 9 MG/ML
1000 INJECTION, SOLUTION INTRAVENOUS
Refills: 0 | Status: DISCONTINUED | OUTPATIENT
Start: 2022-09-23 | End: 2022-09-24

## 2022-09-23 RX ORDER — LABETALOL HCL 100 MG
200 TABLET ORAL
Refills: 0 | Status: DISCONTINUED | OUTPATIENT
Start: 2022-09-23 | End: 2022-09-25

## 2022-09-23 RX ORDER — OXYTOCIN 10 UNIT/ML
333.33 VIAL (ML) INJECTION
Qty: 20 | Refills: 0 | Status: DISCONTINUED | OUTPATIENT
Start: 2022-09-23 | End: 2022-09-24

## 2022-09-23 RX ORDER — ACETAMINOPHEN 500 MG
975 TABLET ORAL ONCE
Refills: 0 | Status: DISCONTINUED | OUTPATIENT
Start: 2022-09-23 | End: 2022-09-24

## 2022-09-23 RX ORDER — SERTRALINE 25 MG/1
50 TABLET, FILM COATED ORAL DAILY
Refills: 0 | Status: DISCONTINUED | OUTPATIENT
Start: 2022-09-23 | End: 2022-09-25

## 2022-09-23 RX ADMIN — Medication 2 MILLIUNIT(S)/MIN: at 09:45

## 2022-09-23 NOTE — PHARMACOTHERAPY INTERVENTION NOTE - COMMENTS
Sertraline order with reduced Na.  Team reports home meds would like to continue at this time they will trend Na while in house

## 2022-09-23 NOTE — OB PROVIDER H&P - HISTORY OF PRESENT ILLNESS
36y  @ 37 weeks by LMP, WHIT 10/14/2022, h/o cHTN, anxiety, and depression, presents for IOL for chronic hypertension and worsening elevated blood pressure. Hypertension controlled with Labetalol 200 mg, Anxiety/Depression controlled with Zoloft 50 mg. GBS negative. Denies headache, blurry vision, shortness of breath, chest pain, Epigastric/RUQ pain, abnormal swelling of hands and feet. Denies VB, LOF, and ctx. +FM.

## 2022-09-23 NOTE — OB PROVIDER LABOR PROGRESS NOTE - ASSESSMENT
IMP: IUP @ 37 wks, CH HTN, Pitocin Induction with progress of labor    Plan: Continue Pitocin and pain management, Anticipated , PSO Aware
IMP: IUP @ 37wks, Gest HTN, Pitocin Induction    Plan:  Continue Pitocin, Pain Management, Anticipated , PSO Aware

## 2022-09-23 NOTE — OB RN DELIVERY SUMMARY - NSSELHIDDEN_OBGYN_ALL_OB_FT
[NS_DeliveryAttending1_OBGYN_ALL_OB_FT:[id number]] [NS_DeliveryAttending1_OBGYN_ALL_OB_FT:[id number]],[NS_DeliveryRN_OBGYN_ALL_OB_FT:[id number]],[NS_CirculateRN2_OBGYN_ALL_OB_FT:[id number]]

## 2022-09-23 NOTE — OB PROVIDER H&P - ATTENDING COMMENTS
IMP: IUP @ 37 wks, CH HTN R/o Superimposed PreEclampsia, Favorable Cervix at Term    Plan: Admit, Pitocin Induction, Labs, Pain Management, Anticipated , PSO Aware

## 2022-09-23 NOTE — OB PROVIDER H&P - NSHPLABSRESULTS_GEN_ALL_CORE
6/30/2022: 24.6 weeks:  gms (63%), vtx, post placenta, VALDEMAR 4.8 cm  6/15/2022: 22.5 weeks: breech, post placenta, VALDEMAR 6.7 cm, no major fetal malformated   5/24/2022: 19.4 weeks:  gms, vtx, post placenta, VALDEMAR 5.6 cm, CL 4.5 cm, no major fetal anomalies seen, long bones normal length  4/26/2022: 15.4 weeks:  gms, post placenta, long bones appear normal  4/45/2022: 13.4 weeks: post placenta, NT 1.9 mm

## 2022-09-23 NOTE — PROCEDURE NOTE - ADDITIONAL PROCEDURE DETAILS
Thorough discussion of patient's history, as indicated above.  Discussed risks of epidural, including PDPH, inadequate analgesia occasionally requiring epidural catheter replacement, bleeding, infection and spinal cord injury.  Patient expressed understanding of these risks, signed informed consent and wishes to proceed with epidural catheter insertion.  Lumbar epidural performed at L3-4. Standard ASA monitors including BP, pulse oximetry, FHR. Sterile gloves, chlorhexidine prep. 1% lidocaine for local infiltration. 17g Touhy. RADHA to saline at 7cm. 25g Joceline needle with clear CSF, no heme. Epidural catheter threaded easily to 12cm. Touhy needle removed. Catheter secured in place. Aspiration negative for blood/CSF. Test dose consisting of 3ml 1.5% lidocaine with epinephrine was negative. Remaining 2ml of test dose consisting of 1.5% lidocaine with epinephrine and 5ml of 1% lidocaine given incrementally after negative aspiration. Patient tolerated procedure, was hemodynamically stable throughout and did not complain of pain or paresthesias. T10 level bilaterally. Epidural infusion consisting of 250ml 0.0625% bupivacaine with fentanyl 2mcg/ml infusing at 10 mL/hour with patient controlled bolus of 5cc q 15 minutes as needed for pain.

## 2022-09-23 NOTE — OB PROVIDER H&P - NSHPPHYSICALEXAM_GEN_ALL_CORE
T(C): 36.8 (09-23-22 @ 08:33), Max: 36.8 (09-23-22 @ 08:33)  HR: 107 (09-23-22 @ 08:33) (107 - 107)  RR: 16 (09-23-22 @ 08:33) (16 - 16)    EFM: 125 bpm, moderate variability, +accels  TOCO: irregular    Abd: soft, gravid, nontender

## 2022-09-23 NOTE — OB PROVIDER LABOR PROGRESS NOTE - NS_SUBJECTIVE/OBJECTIVE_OBGYN_ALL_OB_FT
Pt examined for progress of labor, no compliants    VSS - Afebrile    Pit @ 20
Pt seen at bedside for labor progress, no complaints    VSS - Afebrile    VE - 3/50/-2 - AROM clear    Pit @ 14

## 2022-09-23 NOTE — PROCEDURE NOTE - NSANESTHEXAM_OBGYN_ALL_OB_FT
36y  @ 37 weeks, h/o cHTN, palpitations, tachycardia (s/p Holter monitor x 2 - no arrhythmias noted), anxiety, and depression, presents for IOL for chronic hypertension and worsening elevated blood pressures. Hypertension controlled with Labetalol 200 mg, Anxiety/Depression controlled with Zoloft 50 mg.

## 2022-09-23 NOTE — PROCEDURAL SAFETY CHECKLIST WITH OR WITHOUT SEDATION - NSPX2BRECORDED_GEN_ALL_CORE
epidural catheter placement Pt's  at bedside for hx, medical chart, previous RD notes/family/significant other

## 2022-09-23 NOTE — OB PROVIDER H&P - ASSESSMENT
36y  @ 37 weeks, h.o cHTN, anxiety, depression, GBS negative, IOL for cHTN.    Admit to L & D  IV hydration, labs  Continuous EFM & TOCO monitoring  Clear Liquid diet  Pain Management PRN  IOL w/ Pitocin    Dr. Smith aware

## 2022-09-23 NOTE — OB PROVIDER H&P - NS_OBGYNHISTORY_OBGYN_ALL_OB_FT
OB Hx:  x 2. Largest 7-0               SAB x 4. D&C x 0    G1 2018 [facility]  M 7-0 [facility] No complications +Epi  G2 2019 [facility]  M 6-12 [facility] No complications +Epi    Gyn Hx: cyst  Denies fibroids, abnormal paps, and STIs.

## 2022-09-24 LAB
BASOPHILS # BLD AUTO: 0.02 K/UL — SIGNIFICANT CHANGE UP (ref 0–0.2)
BASOPHILS NFR BLD AUTO: 0.1 % — SIGNIFICANT CHANGE UP (ref 0–1)
EOSINOPHIL # BLD AUTO: 0.02 K/UL — SIGNIFICANT CHANGE UP (ref 0–0.7)
EOSINOPHIL NFR BLD AUTO: 0.1 % — SIGNIFICANT CHANGE UP (ref 0–8)
HCT VFR BLD CALC: 29.4 % — LOW (ref 37–47)
HGB BLD-MCNC: 9.4 G/DL — LOW (ref 12–16)
IMM GRANULOCYTES NFR BLD AUTO: 0.6 % — HIGH (ref 0.1–0.3)
LYMPHOCYTES # BLD AUTO: 1.75 K/UL — SIGNIFICANT CHANGE UP (ref 1.2–3.4)
LYMPHOCYTES # BLD AUTO: 10.5 % — LOW (ref 20.5–51.1)
MCHC RBC-ENTMCNC: 25.5 PG — LOW (ref 27–31)
MCHC RBC-ENTMCNC: 32 G/DL — SIGNIFICANT CHANGE UP (ref 32–37)
MCV RBC AUTO: 79.9 FL — LOW (ref 81–99)
MONOCYTES # BLD AUTO: 1.12 K/UL — HIGH (ref 0.1–0.6)
MONOCYTES NFR BLD AUTO: 6.7 % — SIGNIFICANT CHANGE UP (ref 1.7–9.3)
NEUTROPHILS # BLD AUTO: 13.61 K/UL — HIGH (ref 1.4–6.5)
NEUTROPHILS NFR BLD AUTO: 82 % — HIGH (ref 42.2–75.2)
NRBC # BLD: 0 /100 WBCS — SIGNIFICANT CHANGE UP (ref 0–0)
PLATELET # BLD AUTO: 278 K/UL — SIGNIFICANT CHANGE UP (ref 130–400)
RBC # BLD: 3.68 M/UL — LOW (ref 4.2–5.4)
RBC # FLD: 15.2 % — HIGH (ref 11.5–14.5)
WBC # BLD: 16.62 K/UL — HIGH (ref 4.8–10.8)
WBC # FLD AUTO: 16.62 K/UL — HIGH (ref 4.8–10.8)

## 2022-09-24 RX ORDER — PRAMOXINE HYDROCHLORIDE 150 MG/15G
1 AEROSOL, FOAM RECTAL EVERY 4 HOURS
Refills: 0 | Status: DISCONTINUED | OUTPATIENT
Start: 2022-09-24 | End: 2022-09-25

## 2022-09-24 RX ORDER — IBUPROFEN 200 MG
600 TABLET ORAL EVERY 6 HOURS
Refills: 0 | Status: COMPLETED | OUTPATIENT
Start: 2022-09-24 | End: 2023-08-23

## 2022-09-24 RX ORDER — KETOROLAC TROMETHAMINE 30 MG/ML
30 SYRINGE (ML) INJECTION ONCE
Refills: 0 | Status: DISCONTINUED | OUTPATIENT
Start: 2022-09-24 | End: 2022-09-24

## 2022-09-24 RX ORDER — LANOLIN
1 OINTMENT (GRAM) TOPICAL EVERY 6 HOURS
Refills: 0 | Status: DISCONTINUED | OUTPATIENT
Start: 2022-09-24 | End: 2022-09-25

## 2022-09-24 RX ORDER — OXYTOCIN 10 UNIT/ML
333.33 VIAL (ML) INJECTION
Qty: 20 | Refills: 0 | Status: DISCONTINUED | OUTPATIENT
Start: 2022-09-24 | End: 2022-09-25

## 2022-09-24 RX ORDER — IBUPROFEN 200 MG
600 TABLET ORAL EVERY 6 HOURS
Refills: 0 | Status: DISCONTINUED | OUTPATIENT
Start: 2022-09-24 | End: 2022-09-25

## 2022-09-24 RX ORDER — BENZOCAINE 10 %
1 GEL (GRAM) MUCOUS MEMBRANE EVERY 6 HOURS
Refills: 0 | Status: DISCONTINUED | OUTPATIENT
Start: 2022-09-24 | End: 2022-09-25

## 2022-09-24 RX ORDER — AER TRAVELER 0.5 G/1
1 SOLUTION RECTAL; TOPICAL EVERY 4 HOURS
Refills: 0 | Status: DISCONTINUED | OUTPATIENT
Start: 2022-09-24 | End: 2022-09-25

## 2022-09-24 RX ORDER — MAGNESIUM HYDROXIDE 400 MG/1
30 TABLET, CHEWABLE ORAL
Refills: 0 | Status: DISCONTINUED | OUTPATIENT
Start: 2022-09-24 | End: 2022-09-25

## 2022-09-24 RX ORDER — TETANUS TOXOID, REDUCED DIPHTHERIA TOXOID AND ACELLULAR PERTUSSIS VACCINE, ADSORBED 5; 2.5; 8; 8; 2.5 [IU]/.5ML; [IU]/.5ML; UG/.5ML; UG/.5ML; UG/.5ML
0.5 SUSPENSION INTRAMUSCULAR ONCE
Refills: 0 | Status: DISCONTINUED | OUTPATIENT
Start: 2022-09-24 | End: 2022-09-25

## 2022-09-24 RX ORDER — SODIUM CHLORIDE 9 MG/ML
3 INJECTION INTRAMUSCULAR; INTRAVENOUS; SUBCUTANEOUS EVERY 8 HOURS
Refills: 0 | Status: DISCONTINUED | OUTPATIENT
Start: 2022-09-24 | End: 2022-09-25

## 2022-09-24 RX ORDER — DIPHENHYDRAMINE HCL 50 MG
25 CAPSULE ORAL EVERY 6 HOURS
Refills: 0 | Status: DISCONTINUED | OUTPATIENT
Start: 2022-09-24 | End: 2022-09-25

## 2022-09-24 RX ORDER — HYDROCORTISONE 1 %
1 OINTMENT (GRAM) TOPICAL EVERY 6 HOURS
Refills: 0 | Status: DISCONTINUED | OUTPATIENT
Start: 2022-09-24 | End: 2022-09-25

## 2022-09-24 RX ORDER — ACETAMINOPHEN 500 MG
975 TABLET ORAL
Refills: 0 | Status: DISCONTINUED | OUTPATIENT
Start: 2022-09-24 | End: 2022-09-25

## 2022-09-24 RX ORDER — SIMETHICONE 80 MG/1
80 TABLET, CHEWABLE ORAL EVERY 4 HOURS
Refills: 0 | Status: DISCONTINUED | OUTPATIENT
Start: 2022-09-24 | End: 2022-09-25

## 2022-09-24 RX ORDER — DIBUCAINE 1 %
1 OINTMENT (GRAM) RECTAL EVERY 6 HOURS
Refills: 0 | Status: DISCONTINUED | OUTPATIENT
Start: 2022-09-24 | End: 2022-09-25

## 2022-09-24 RX ADMIN — Medication 200 MILLIGRAM(S): at 17:20

## 2022-09-24 RX ADMIN — SODIUM CHLORIDE 3 MILLILITER(S): 9 INJECTION INTRAMUSCULAR; INTRAVENOUS; SUBCUTANEOUS at 14:18

## 2022-09-24 RX ADMIN — Medication 975 MILLIGRAM(S): at 14:08

## 2022-09-24 RX ADMIN — Medication 975 MILLIGRAM(S): at 14:38

## 2022-09-24 RX ADMIN — Medication 30 MILLIGRAM(S): at 08:30

## 2022-09-24 RX ADMIN — SERTRALINE 50 MILLIGRAM(S): 25 TABLET, FILM COATED ORAL at 14:07

## 2022-09-24 NOTE — OB PROVIDER DELIVERY SUMMARY - AS DELIV COMPLICATIONS OB
This patient was referred for tympanometric testing by CASANDRA Padilla due to COME, bilaterally. Tympanometry revealed flat tympanogram, with no middle ear peak pressure, bilaterally. Ipsilateral acoustic reflexes were absent, bilaterally at 1000Hz. The results were reviewed with the patient. Recommendations for follow up will be made pending physician consult.     Olivia Scott CCC/RHETT  Audiologist  [id number]  NPI#:  [id number]
none

## 2022-09-24 NOTE — OB PROVIDER DELIVERY SUMMARY - NSSELHIDDEN_OBGYN_ALL_OB_FT
[NS_DeliveryAttending1_OBGYN_ALL_OB_FT:[id number]=],[NS_DeliveryRN_OBGYN_ALL_OB_FT:[id number]=],[NS_CirculateRN2_OBGYN_ALL_OB_FT:[id number]=]

## 2022-09-24 NOTE — OB PROVIDER DELIVERY SUMMARY - NSPROVIDERDELIVERYNOTE_OBGYN_ALL_OB_FT
Pt became fully dilated and pushed well over intact perineum, delivery of head in RAKESH position, nose and mouth bulb suctioned, followed by delivery of shoulders and body without difficulty, live female infant, APGARS 9/9, 2820gms, 3-vessel cord + placenta complete, no complications

## 2022-09-25 ENCOUNTER — TRANSCRIPTION ENCOUNTER (OUTPATIENT)
Age: 37
End: 2022-09-25

## 2022-09-25 VITALS
TEMPERATURE: 98 F | RESPIRATION RATE: 18 BRPM | SYSTOLIC BLOOD PRESSURE: 132 MMHG | HEART RATE: 94 BPM | DIASTOLIC BLOOD PRESSURE: 72 MMHG

## 2022-09-25 RX ORDER — ACETAMINOPHEN 500 MG
3 TABLET ORAL
Qty: 0 | Refills: 0 | DISCHARGE
Start: 2022-09-25

## 2022-09-25 RX ORDER — LABETALOL HCL 100 MG
1 TABLET ORAL
Qty: 0 | Refills: 0 | DISCHARGE
Start: 2022-09-25

## 2022-09-25 RX ORDER — IBUPROFEN 200 MG
1 TABLET ORAL
Qty: 0 | Refills: 0 | DISCHARGE
Start: 2022-09-25

## 2022-09-25 RX ADMIN — Medication 600 MILLIGRAM(S): at 12:42

## 2022-09-25 RX ADMIN — Medication 200 MILLIGRAM(S): at 06:19

## 2022-09-25 RX ADMIN — SERTRALINE 50 MILLIGRAM(S): 25 TABLET, FILM COATED ORAL at 12:42

## 2022-09-25 RX ADMIN — Medication 600 MILLIGRAM(S): at 00:01

## 2022-09-25 RX ADMIN — Medication 975 MILLIGRAM(S): at 09:09

## 2022-09-25 NOTE — DISCHARGE NOTE OB - PATIENT PORTAL LINK FT
You can access the FollowMyHealth Patient Portal offered by [facility] by registering at the following website: [email or web address]. By joining [facility] [facility]’s FollowMyHealth portal, you will also be able to view your health information using other applications (apps) compatible with our system.

## 2022-09-25 NOTE — DISCHARGE NOTE OB - MEDICATION SUMMARY - MEDICATIONS TO TAKE
I will START or STAY ON the medications listed below when I get home from the hospital:    acetaminophen 325 mg oral tablet  -- 3 tab(s) by mouth every 6 hours, As Needed  -- Indication: For pain    ibuprofen 600 mg oral tablet  -- 1 tab(s) by mouth every 6 hours, As Needed  -- Indication: For pain    labetalol 200 mg oral tablet  -- 1 tab(s) by mouth 2 times a day  -- Indication: For chtn

## 2022-09-25 NOTE — PROGRESS NOTE ADULT - SUBJECTIVE AND OBJECTIVE BOX
PGY 4 Note    Patient seen at bedside for evaluation of labor progression. Comfortable s/p epidural. With intermittent variable decelerations. Resuscitated with position.    Vital Signs Last 24 Hrs  T(C): 36.8 (24 Sep 2022 04:24), Max: 36.8 (23 Sep 2022 08:33)  T(F): 98.24 (24 Sep 2022 04:24), Max: 98.3 (23 Sep 2022 08:33)  HR: 103 (24 Sep 2022 05:09) (82 - 142)  BP: 163/96 (24 Sep 2022 05:09) (115/67 - 163/96)  RR: 18 (24 Sep 2022 04:24) (16 - 18)  SpO2: 97% (24 Sep 2022 05:07) (90% - 100%)    Parameters below as of 23 Sep 2022 08:33  Patient On (Oxygen Delivery Method): room air      EFM: 145/mod/+accel/intermittent variable decelerations, cat II  TOCO: q2-3m  SVE: 10/100/+2 @0510    Labs:                        10.1   8.94  )-----------( 307      ( 23 Sep 2022 08:39 )             32.1     09-    134<L>  |  101  |  15  ----------------------------<  73  4.4   |  20  |  0.5<L>    Ca    9.2      23 Sep 2022 08:39    TPro  6.9  /  Alb  3.6  /  TBili  <0.2  /  DBili  x   /  AST  16  /  ALT  11  /  AlkPhos  124<H>      ABO RH Interpretation: A POS (22 @ 09:01)    Urinalysis Basic - ( 23 Sep 2022 08:39 )    Color: Yellow / Appearance: Clear / S.024 / pH: x  Gluc: x / Ketone: Negative  / Bili: Negative / Urobili: <2 mg/dL   Blood: x / Protein: Trace / Nitrite: Negative   Leuk Esterase: Negative / RBC: 3 /HPF / WBC 4 /HPF   Sq Epi: x / Non Sq Epi: 3 /HPF / Bacteria: Few        Meds: acetaminophen     Tablet .. 975 milliGRAM(s) Oral once  labetalol 200 milliGRAM(s) Oral two times a day  lactated ringers. 1000 milliLiter(s) IV Continuous <Continuous>  oxytocin Infusion 333.333 milliUNIT(s)/Min IV Continuous <Continuous>  oxytocin Infusion. 2 milliUNIT(s)/Min IV Continuous <Continuous>  sertraline 50 milliGRAM(s) Oral daily      
Pt seen at bedside, no complaints, nursing    Vital Signs Last 24 Hrs  T(C): 36.9 (25 Sep 2022 11:42), Max: 37 (24 Sep 2022 16:27)  T(F): 98.4 (25 Sep 2022 11:42), Max: 98.6 (24 Sep 2022 16:27)  HR: 94 (25 Sep 2022 11:42) (88 - 94)  BP: 132/72 (25 Sep 2022 11:42) (109/68 - 135/68)  RR: 18 (25 Sep 2022 11:42) (18 - 18)        Resp - CTA b/l  CVS - S1S2+, RRR  Breasts - soft, NT  Abd - soft, NTND, BS+, uterus - firm  VE - Minimal lochia, perineum- intact  Ext - No Homans                            9.4    16.62 )-----------( 278      ( 24 Sep 2022 17:07 )             29.4                         10.1   8.94  )-----------( 307      ( 23 Sep 2022 08:39 )             32.1     A POS, Rubella - Immune, Rubeola - Immune, RPR - NR
  PGY2 Progress Note    Patient seen and examined at bedside, no current complaints.        Vital Signs Last 24 Hrs  T(C): 36.8 (23 Sep 2022 08:33), Max: 36.8 (23 Sep 2022 08:33)  T(F): 98.3 (23 Sep 2022 08:33), Max: 98.3 (23 Sep 2022 08:33)  HR: 85 (23 Sep 2022 10:56) (85 - 107)  BP: 128/81 (23 Sep 2022 10:56) (128/75 - 147/96)  RR: 16 (23 Sep 2022 08:33) (16 - 16)    Parameters below as of 23 Sep 2022 08:33  Patient On (Oxygen Delivery Method): room air      EFM: 135BPM/mod hemant/accels pos  TOCO: q3-8min  SVE: 2/50/-3 per Dr. Smith @ 0954    Medications:  oxytocin Infusion.: 2 mL/Hr (22 @ 09:29) @4mU/min    Labs:                        10.1   8.94  )-----------( 307      ( 23 Sep 2022 08:39 )             32.1         134<L>  |  101  |  15  ----------------------------<  73  4.4   |  20  |  0.5<L>    Ca    9.2      23 Sep 2022 08:39    TPro  6.9  /  Alb  3.6  /  TBili  <0.2  /  DBili  x   /  AST  16  /  ALT  11  /  AlkPhos  124<H>      ABO RH Interpretation: A POS (22 @ 09:01)    Urinalysis Basic - ( 23 Sep 2022 08:39 )    Color: Yellow / Appearance: Clear / S.024 / pH: x  Gluc: x / Ketone: Negative  / Bili: Negative / Urobili: <2 mg/dL   Blood: x / Protein: Trace / Nitrite: Negative   Leuk Esterase: Negative / RBC: 3 /HPF / WBC 4 /HPF   Sq Epi: x / Non Sq Epi: 3 /HPF / Bacteria: Few                
PGY4 Note    Patient seen at bedside for evaluation of labor progression. Feeling constant pressure with contractions.  With intermittent variable decelerations.  Pitocin turned down to 8mu/min.  Resuscitated with position change.    Vital Signs Last 24 Hrs  T(C): 36.6 (23 Sep 2022 21:50), Max: 36.8 (23 Sep 2022 08:33)  T(F): 97.88 (23 Sep 2022 21:50), Max: 98.3 (23 Sep 2022 08:33)  HR: 100 (24 Sep 2022 00:35) (82 - 142)  BP: 139/75 (24 Sep 2022 00:23) (115/67 - 157/94)  RR: 16 (23 Sep 2022 08:33) (16 - 16)  SpO2: 95% (24 Sep 2022 00:37) (90% - 100%)    Parameters below as of 23 Sep 2022 08:33  Patient On (Oxygen Delivery Method): room air    EFM: 145/mod/+accel/intermittent variable decelerations  TOCO: q2-3m  SVE: 5/70/-2 @0000    Labs:                        10.1   8.94  )-----------( 307      ( 23 Sep 2022 08:39 )             32.1     09-    134<L>  |  101  |  15  ----------------------------<  73  4.4   |  20  |  0.5<L>    Ca    9.2      23 Sep 2022 08:39    TPro  6.9  /  Alb  3.6  /  TBili  <0.2  /  DBili  x   /  AST  16  /  ALT  11  /  AlkPhos  124<H>  09-23    ABO RH Interpretation: A POS (22 @ 09:01)    Urinalysis Basic - ( 23 Sep 2022 08:39 )    Color: Yellow / Appearance: Clear / S.024 / pH: x  Gluc: x / Ketone: Negative  / Bili: Negative / Urobili: <2 mg/dL   Blood: x / Protein: Trace / Nitrite: Negative   Leuk Esterase: Negative / RBC: 3 /HPF / WBC 4 /HPF   Sq Epi: x / Non Sq Epi: 3 /HPF / Bacteria: Few          Meds: acetaminophen     Tablet .. 975 milliGRAM(s) Oral once  labetalol 200 milliGRAM(s) Oral two times a day  lactated ringers. 1000 milliLiter(s) IV Continuous <Continuous>  oxytocin Infusion 333.333 milliUNIT(s)/Min IV Continuous <Continuous>  oxytocin Infusion. 2 milliUNIT(s)/Min IV Continuous <Continuous>, currently at 8mu/min  sertraline 50 milliGRAM(s) Oral daily

## 2022-09-25 NOTE — DISCHARGE NOTE OB - CARE PROVIDER_API CALL
Jeremy Smith)  Obstetrics and Gynecology  [address] [place] [address]  Phone: [phone]  Fax: [phone]  Follow Up Time:

## 2022-09-25 NOTE — DISCHARGE NOTE OB - NS MD DC FALL RISK RISK
For information on Fall & Injury Prevention, visit: [email or web address] OR  [email or web address] OR  https://www.cdc.gov/steadi/patient.html

## 2022-09-25 NOTE — PROGRESS NOTE ADULT - ASSESSMENT
A/P:   36y  @ 37 weeks, h.o cHTN, anxiety, depression, GBS negative, IOL for cHTN.  - current management  - continuous toco/EFM  - pain management PRN    Dr. Smith to be aware  
A/P:  36y  @37w1d, GBS neg, h/o cHTN, anxiety, depression, IOL for cHTN.    -cont EFM/toco  -clear liquid diet, IVF  -pain management with epidural  -will re-examine in 4 hours or prn    Dr. Smith aware.
A/P:  36y  @37w1d, GBS neg, h/o cHTN, anxiety, depression, IOL for cHTN, in labor.    -cont EFM/toco  -clear liquid diet, IVF  -pain management with epidural  -anticipate vaginal delivery    Dr. Smith aware.
IMP: s/p  - PPD # 1 - Doing Well    Plan: dc home (pt request), NSAID's/Labetolol/PNV's, f/u office - 6 wks

## 2022-09-28 DIAGNOSIS — F41.9 ANXIETY DISORDER, UNSPECIFIED: ICD-10-CM

## 2022-09-28 DIAGNOSIS — R00.0 TACHYCARDIA, UNSPECIFIED: ICD-10-CM

## 2022-09-28 DIAGNOSIS — Z88.5 ALLERGY STATUS TO NARCOTIC AGENT: ICD-10-CM

## 2022-09-28 DIAGNOSIS — F32.A DEPRESSION, UNSPECIFIED: ICD-10-CM

## 2022-09-28 DIAGNOSIS — Z82.49 FAMILY HISTORY OF ISCHEMIC HEART DISEASE AND OTHER DISEASES OF THE CIRCULATORY SYSTEM: ICD-10-CM

## 2022-09-28 DIAGNOSIS — Z28.09 IMMUNIZATION NOT CARRIED OUT BECAUSE OF OTHER CONTRAINDICATION: ICD-10-CM

## 2022-09-28 DIAGNOSIS — Z3A.37 37 WEEKS GESTATION OF PREGNANCY: ICD-10-CM

## 2022-09-28 DIAGNOSIS — Z88.8 ALLERGY STATUS TO OTHER DRUGS, MEDICAMENTS AND BIOLOGICAL SUBSTANCES STATUS: ICD-10-CM

## 2022-10-22 NOTE — OB RN PATIENT PROFILE - NS_VBACATTEMPT_OBGYN_ALL_OB
Cough/sneezing for two days. Now with fevers at home and vomiting. Poor intake since 0500. Patient fussy, warm to touch. Still having wet diapers. N/A

## 2023-01-24 NOTE — OB PROVIDER H&P - NS_TUBALPLANNED_OBGYN_ALL_OB
Alternatives Discussed Intro (Do Not Add Period): I discussed alternative treatments to Mohs surgery and specifically discussed the risks and benefits of No

## 2023-08-28 NOTE — ED ADULT TRIAGE NOTE - BP NONINVASIVE DIASTOLIC (MM HG)
placing items on high shelf. - in progress  3. Patient will demonstrate a loss of volume of the RUE from 2525.07 ml to 2400 ml to reduce the risk of infection and progression of swelling over time to decrease pt's sense of limb heaviness and tightness and improve pt's body image. - in progress  4. Patient will be measured for and obtain comfortable and optimal fitting compression products to prevent reaccumulation of fluid at discharge which could impair patient's ability to perform UB dressing and overhead reaching during instrumental ADLs. - in progress      PLAN  Yes  Continue plan of care  Re-Cert Due: 77/38/6621  [x]  Upgrade activities as tolerated  []  Discharge due to :  []  Other:      Angeline Dixon OT, ELSA      8/28/2023       7:37 AM
88

## 2023-09-14 NOTE — ED ADULT NURSE NOTE - CAS DISCH ACCOMP BY
Parent(s) Quality 226: Preventive Care And Screening: Tobacco Use: Screening And Cessation Intervention: Patient screened for tobacco use and is an ex/non-smoker Detail Level: Detailed

## 2024-03-11 NOTE — OB PROVIDER TRIAGE NOTE - PRETERM DELIVERIES, OB PROFILE
Left message for the patient to let her know that her lab orders have been placed and to make sure to drink plenty of water.    0

## 2024-04-02 NOTE — OB RN PATIENT PROFILE - NS_LASTFOOD_OBGYN_ALL_OB_FT
----- Message from Christ Vences DO sent at 4/2/2024  8:17 AM CDT -----    # s/p KYLER ligation with no residual flow.    # Spontaneous echo contrast visualized in the left atrium.    # Mild-moderate aortic valve regurgitation.    # Dilated aortic root at the sinuses measuring 4.7 cm.    # Dilated ascending aorta measuring 5.2 cm.    # Mild layered immobile plaque throughout aorta.    # No evidence of shunt at atrial level.    # No pericardial effusion.    Ascending aorta was last evaluated 12/2023, s/p repair, intact            cereal
